# Patient Record
Sex: FEMALE | Race: OTHER | Employment: OTHER | URBAN - METROPOLITAN AREA
[De-identification: names, ages, dates, MRNs, and addresses within clinical notes are randomized per-mention and may not be internally consistent; named-entity substitution may affect disease eponyms.]

---

## 2017-01-04 ENCOUNTER — TELEPHONE (OUTPATIENT)
Dept: FAMILY MEDICINE CLINIC | Age: 82
End: 2017-01-04

## 2017-01-04 NOTE — TELEPHONE ENCOUNTER
Pt's son CURTIS saying the patient patient needs a referral to [facility]. He left their fax number #[phone]. Called the son to ask which physician and date of appt. No answer. Called [facility] to ask for this information - they could find no record of the patient having an appointment. LM asking the son to call [facility] - the pt does not have any [facility] providers as her PCP. In order for us to obtain the referral they will need to call [facility] to change her PCP.

## 2017-01-06 NOTE — TELEPHONE ENCOUNTER
Pt eb Zepeda called with the information about needing the backdated referral for [address] with Dr Ellen Emmanuel on 1/3/17.

## 2017-01-09 ENCOUNTER — HOSPITAL ENCOUNTER (OUTPATIENT)
Dept: WOUND CARE | Age: 82
Discharge: HOME | End: 2017-01-09
Payer: MEDICARE

## 2017-01-09 PROCEDURE — 99212 OFFICE O/P EST SF 10 MIN: CPT

## 2017-01-09 PROCEDURE — 97597 DBRDMT OPN WND 1ST 20 CM/<: CPT

## 2017-01-09 RX ORDER — CEPHALEXIN 500 MG/1
500 CAPSULE ORAL 4 TIMES DAILY
COMMUNITY
End: 2017-06-23 | Stop reason: ALTCHOICE

## 2017-01-09 RX ORDER — LIDOCAINE HYDROCHLORIDE 20 MG/ML
JELLY TOPICAL
Status: ACTIVE | OUTPATIENT
Start: 2017-01-09 | End: 2017-01-10

## 2017-01-09 NOTE — PROGRESS NOTES
[address] [place] [facility] [place] [address]   WOUND CARE PROGRESS NOTE       Name:  Ant Hu   MR#:  [id number]   :  1932   Account #:  [de-identified]        Date of Adm:  2017       DATE OF SERVICE: 2017     TREATING PHYSICIAN: Magdaleno Mckeon DPM    SUBJECTIVE: The patient presents today with her grandson for a   followup to a left venous leg ulceration. The patient denies any nausea,   vomiting, fevers, night sweats or chills. OBJECTIVE   VITAL SIGNS: Stable. The patient is afebrile. LEFT LOWER EXTREMITY: There is a 0.4 x 0.6 x 0.2 cm wound   along the lateral aspect of the left lateral malleolus. The wound has a   punched-out appearance and is granular in nature, with a small   amount of fibrosis noted on the 1-2 o'clock margin. DP and PT pulses   are palpable. Protective sensation is intact. There is dependent edema   in the left lower extremity, with numerous venous varicosities present. ASSESSMENT   1. Left venous leg ulceration. 2. Left lower extremity edema. PLAN   1. At today's visit, the wound was topically anesthetized with lidocaine,   and once anesthesia was achieved, it was excisionally debrided using   a sterile curette down to the level of subcutaneous tissue. 2. A piece of Aquacel and a 3-layer compression wrap were applied to   the left lower extremity. The patient is to keep this clean and dry. She   is to elevate when at rest.   3. The patient to follow up in 1 week for a recheck. Once we are able   to get this wound healed, will try to transition the patient to CircAid   wraps permanently.         PATT [facility] DIVINE EVANS   D:  2017   15:00   T:  2017   16:24   Job #:  [id number]

## 2017-01-10 NOTE — TELEPHONE ENCOUNTER
Called the patient's grandson, had to LM on his VM. The patient has Dr Summer Villalobos as her PCP which means we cannot get a backdated referral from [facility]. They need to call Naima to change her PCP to one of our providers, however we will not be able to backdate referrals.

## 2017-01-16 ENCOUNTER — HOSPITAL ENCOUNTER (OUTPATIENT)
Dept: WOUND CARE | Age: 82
Discharge: HOME | End: 2017-01-16
Payer: MEDICARE

## 2017-01-16 PROCEDURE — 97597 DBRDMT OPN WND 1ST 20 CM/<: CPT

## 2017-01-16 RX ORDER — LIDOCAINE HYDROCHLORIDE 20 MG/ML
JELLY TOPICAL
Status: COMPLETED | OUTPATIENT
Start: 2017-01-16 | End: 2017-01-16

## 2017-01-16 RX ADMIN — LIDOCAINE HYDROCHLORIDE 5 ML: 20 JELLY TOPICAL at 14:55

## 2017-01-19 NOTE — TELEPHONE ENCOUNTER
Pt's grandson called back, they had changed provider to Dr Иван Jaime in November. Referral was obtained.

## 2017-01-23 ENCOUNTER — HOSPITAL ENCOUNTER (OUTPATIENT)
Dept: WOUND CARE | Age: 82
Discharge: HOME | End: 2017-01-23
Payer: MEDICARE

## 2017-01-23 PROCEDURE — 97597 DBRDMT OPN WND 1ST 20 CM/<: CPT

## 2017-01-23 RX ORDER — LIDOCAINE HYDROCHLORIDE 20 MG/ML
JELLY TOPICAL
Status: COMPLETED | OUTPATIENT
Start: 2017-01-23 | End: 2017-01-23

## 2017-01-23 RX ADMIN — LIDOCAINE HYDROCHLORIDE: 20 JELLY TOPICAL at 14:19

## 2017-01-24 NOTE — PROGRESS NOTES
[address] [place] [facility] [place] [address]   WOUND CARE PROGRESS NOTE       Name:  Toya Owens   MR#:  [id number]   :  1932   Account #:  [de-identified]        Date of Adm:  2017       DATE OF SERVICE: 2017      TREATING PHYSICIAN: Deborah Pond DPM    SUBJECTIVE: The patient presents today with her grandson for   followup to a left venous leg ulceration. Denies any nausea, vomiting,   fevers, night sweats, chills. States she has pain in that left foot and leg   during the day. OBJECTIVE: Vital signs are stable. The patient afebrile. DP, PT pulses   are palpable. LEFT LOWER EXTREMITY: There is a 1.0 x 0.8 x 0.2 cm wound   along the lateral aspect of the left lower leg. The wound has a   punched-out appearance and is granular in nature with some small   diffuse areas of fibrosis. There is no purulent drainage, no malodor, no   erythema. There is dependent edema in the left lower extremity with   numerous venous varicosities. ASSESSMENT   1. Left venous leg ulceration. 2. Left lower extremity edema. PLAN   1. At today's visit, the wound was anesthetized with topical lidocaine   and once it was anesthetized, it was excisionally debrided using a   sterile curette down to a bleeding granular base. 2. Rocío and a 3-layer compression wrap were applied to the left   lower extremity. The patient is to keep this clean and dry and to   elevate when at rest.   3. A prescription for tramadol 50 mg, a total of 40 tablets was written   for the patient with the instructions to take 1 tab by mouth every 6-8   hours as needed for pain with no refills. 4. The patient is to followup with me in 1 w for a recheck. PATT Roldan   D:  2017   14:54   T:  2017   21:43   Job #:  [id number]     FAX COPY OF REPORT TO DR. Mariela Felix [phone].

## 2017-01-30 ENCOUNTER — HOSPITAL ENCOUNTER (OUTPATIENT)
Dept: WOUND CARE | Age: 82
Discharge: HOME | End: 2017-01-30
Payer: MEDICARE

## 2017-01-30 PROCEDURE — 97597 DBRDMT OPN WND 1ST 20 CM/<: CPT

## 2017-01-30 PROCEDURE — 74011000250 HC RX REV CODE- 250: Performed by: PODIATRIST

## 2017-01-30 RX ORDER — LIDOCAINE HYDROCHLORIDE 20 MG/ML
JELLY TOPICAL
Status: COMPLETED | OUTPATIENT
Start: 2017-01-30 | End: 2017-01-30

## 2017-01-30 RX ADMIN — LIDOCAINE HYDROCHLORIDE: 20 JELLY TOPICAL at 14:23

## 2017-01-31 NOTE — PROGRESS NOTES
[address] [place] [facility] [place] [facility], [address]   WOUND CARE PROGRESS NOTE       Name:  Brandon Galarza   MR#:  [id number]   :  1932   Account #:  [de-identified]        Date of Adm:  2017       DATE OF SERVICE: 2017    TREATING PHYSICIAN: Rosalia Blanco DPM    SUBJECTIVE: The patient presents today with her grandson for   followup to a left venous leg ulceration. Denies any nausea, vomiting,   fevers, night sweats or chills. Arrived with her compression wrap clean,   dry and intact. States that her pain is under control. PHYSICAL EXAMINATION   VITAL SIGNS: Baseline vital signs are stable. The patient is afebrile. LEFT LOWER EXTREMITY: There is a 0.9 x 0.8 x 0.2 cm wound   along the lateral aspect of the left lower leg. The wound has a   punched-out appearance and is granular in nature. There is no   purulent drainage, no malodor. There is dependent edema in the left   lower extremity. DP, PT pulses are palpable. ASSESSMENT   1. Left venous leg ulceration. 2. Left lower extremity edema. PLAN: I had a long discussion with the patient and her grandson this   afternoon. Clinically, her wound is looking clean and granular;   however, it has remained roughly the same size for the past 2 weeks. This is her third visit with me at the wound care center. I informed them   that we should probably look into more aggressive measures in order   to get her wound healed in a quicker fashion. We will submit paperwork to see if we can get Grafix preapproved for   the patient. In the meantime, we will stop using Rocío and we will   switch to Aquacel AG and a 3-layer compression wrap on the left lower   extremity. The patient is to keep this wrap clean and dry and she is to   elevate when at rest.    The patient is to follow with me in 1 week for a recheck. We will   hopefully have an answer regarding Grafix at that time.         PATT Castaneda / Joseph Memory D:  01/30/2017   15:35   T:  01/30/2017   20:37   Job #:  [id number]

## 2017-02-06 ENCOUNTER — HOSPITAL ENCOUNTER (OUTPATIENT)
Dept: WOUND CARE | Age: 82
Discharge: HOME | End: 2017-02-06
Payer: MEDICARE

## 2017-02-06 PROCEDURE — 97597 DBRDMT OPN WND 1ST 20 CM/<: CPT

## 2017-02-06 RX ORDER — LIDOCAINE HYDROCHLORIDE 20 MG/ML
JELLY TOPICAL
Status: COMPLETED | OUTPATIENT
Start: 2017-02-06 | End: 2017-02-06

## 2017-02-06 RX ADMIN — LIDOCAINE HYDROCHLORIDE: 20 JELLY TOPICAL at 14:59

## 2017-02-06 NOTE — PROGRESS NOTES
[address] [place] [facility] [place] [address]   WOUND CARE PROGRESS NOTE       Name:  Tyler Norwood   MR#:  [id number]   :  1932   Account #:  [de-identified]        Date of Adm:  2017       DATE OF SERVICE: 2017    TREATING PHYSICIAN: Sergo Yates DPM    SUBJECTIVE: The patient presents today with her grandson for   followup to a left venous leg ulceration. Denies any nausea, vomiting,   fevers, night sweats or chills. States that she has some pain in her   right lower extremity, where she had a wound. PHYSICAL EXAMINATION   VITAL SIGNS: Stable. The patient afebrile. LEFT LOWER EXTREMITY: There is a 0.9 x 0.8 x 0.1 cm wound   along the lateral aspect of the left lower leg. The wound is granular with   no acute signs of infection. There is dependent edema in the left lower   extremity with numerous venous varicosities. DP and PT pulses are   palpable. RIGHT LOWER EXTREMITY: There is a healed anterior venous leg   wound, with dependent edema and venous varicosities in the right   lower extremity. ASSESSMENT   1. Left venous leg ulceration. 2. Bilateral lower extremity edema. PLAN   1. I had a long discussion with the patient and her grandson this   afternoon. Presently, we have been doing Aquacel AG and a 3-layer   compression wrap. The patient's insurance has approved Grafix,   however, the patient has a very high copay with each application,   which she cannot afford. As a result, we need to figure out a different   way to facilitate wound healing. 2. We will start Rocío every other day and downgrade from the 3-  layer compression wrap to a double-layer Tubigrip compression wrap. The grandson will be educated on how to do wound care, and he will   do the wound care as instructed.    3. At today's visit, the wound was anesthetized with topical lidocaine,   and once anesthesia was achieved, it was excisionally debrided down   to a bleeding granular base, down to the level of subcutaneous tissue   using a sterile curette. Rocío and a double-layer Tubigrip   compression dressing was applied to left lower extremity. 4. Regarding the right lower extremity, the patient already has a   CircAid wrap that was ordered for the left side. We will temporarily   have her use on the right side to facilitate compression. She will benefit   from a matching CircAid wrap on the right lower extremity, so we will   measure her and order one for that side as well. 5. The patient to follow up in 1 week for a recheck.         Legrand Leventhal, DPM Montine Parry / Shane Giordano   D:  02/06/2017   15:30   T:  02/06/2017   16:16   Job #:  [id number]

## 2017-02-13 ENCOUNTER — HOSPITAL ENCOUNTER (OUTPATIENT)
Dept: WOUND CARE | Age: 82
Discharge: HOME | End: 2017-02-13
Payer: MEDICARE

## 2017-02-13 PROCEDURE — 97597 DBRDMT OPN WND 1ST 20 CM/<: CPT

## 2017-02-13 RX ORDER — LIDOCAINE HYDROCHLORIDE 20 MG/ML
JELLY TOPICAL
Status: COMPLETED | OUTPATIENT
Start: 2017-02-13 | End: 2017-02-13

## 2017-02-13 RX ADMIN — LIDOCAINE HYDROCHLORIDE: 20 JELLY TOPICAL at 14:29

## 2017-02-20 ENCOUNTER — HOSPITAL ENCOUNTER (OUTPATIENT)
Dept: WOUND CARE | Age: 82
Discharge: HOME | End: 2017-02-20
Payer: MEDICARE

## 2017-02-20 PROCEDURE — 97597 DBRDMT OPN WND 1ST 20 CM/<: CPT

## 2017-03-06 ENCOUNTER — HOSPITAL ENCOUNTER (OUTPATIENT)
Dept: WOUND CARE | Age: 82
Discharge: HOME | End: 2017-03-06
Payer: MEDICARE

## 2017-03-06 PROCEDURE — 99211 OFF/OP EST MAY X REQ PHY/QHP: CPT

## 2017-03-06 RX ORDER — LIDOCAINE HYDROCHLORIDE 20 MG/ML
JELLY TOPICAL
Status: COMPLETED | OUTPATIENT
Start: 2017-03-06 | End: 2017-03-06

## 2017-03-06 RX ADMIN — LIDOCAINE HYDROCHLORIDE: 20 JELLY TOPICAL at 14:41

## 2017-03-06 NOTE — PROGRESS NOTES
[address] [place] [facility] [place] [address]   WOUND CARE PROGRESS NOTE       Name:  Sybil Mcnair   MR#:  [id number]   :  1932   Account #:  [de-identified]        Date of Adm:  2017       DATE OF SERVICE: 2017. TREATING PHYSICIAN: Luis Enrique Armas DPM.    SUBJECTIVE: The patient presents today with her grandson for   followup to a left venous leg ulceration. Denies any nausea, vomiting,   fevers, night sweats or chills. PHYSICAL EXAMINATION   VITAL SIGNS: Stable. The patient is afebrile. LEFT LOWER EXTREMITY: The previously noted left lateral leg   wound is healed. There is no sign of infection. Neurovascular status is   intact and unchanged. Dependent edema is greatly improved and left   lower extremity. ASSESSMENT:   1. Left venous leg ulceration. 2. Left lower extremity edema. PLAN:   1. At today's visit, I had a long discussion with the patient and her   grandson. Her venous wound is now healed. She no longer needs   daily wound care. 2. The patient is to wear her CircAid wrap on her left lower extremity at   all times except for at night. She is to elevate when at rest.   3. The patient is discharged from the wound care center. She is to call   as needed.         PATT Blandon / Sumit.Tammy   D:  2017   15:14   T:  2017   15:52   Job #:  [id number]

## 2017-04-28 DIAGNOSIS — I87.2 VENOUS STASIS DERMATITIS OF RIGHT LOWER EXTREMITY: ICD-10-CM

## 2017-05-12 ENCOUNTER — DOCUMENTATION ONLY (OUTPATIENT)
Dept: FAMILY MEDICINE CLINIC | Age: 82
End: 2017-05-12

## 2017-05-12 RX ORDER — ASPIRIN 81 MG/1
TABLET ORAL
Qty: 180 TAB | Refills: 0 | Status: SHIPPED | OUTPATIENT
Start: 2017-05-12 | End: 2017-08-20 | Stop reason: SDUPTHER

## 2017-05-12 NOTE — PROGRESS NOTES
Pt [facility] for refill of asa 81mg daily #180 no refills, f/s utd, lov Dec 2016, per medication protocol authorizing refill.

## 2017-06-23 ENCOUNTER — OFFICE VISIT (OUTPATIENT)
Dept: FAMILY MEDICINE CLINIC | Age: 82
End: 2017-06-23

## 2017-06-23 VITALS
BODY MASS INDEX: 21.44 KG/M2 | HEIGHT: 60 IN | HEART RATE: 73 BPM | DIASTOLIC BLOOD PRESSURE: 57 MMHG | SYSTOLIC BLOOD PRESSURE: 120 MMHG | TEMPERATURE: 98.5 F | WEIGHT: 109.2 LBS

## 2017-06-23 DIAGNOSIS — R05.3 CHRONIC COUGH: Primary | ICD-10-CM

## 2017-06-23 DIAGNOSIS — K59.01 SLOW TRANSIT CONSTIPATION: ICD-10-CM

## 2017-06-23 RX ORDER — LACTULOSE 10 G/15ML
30 SOLUTION ORAL; RECTAL
Qty: 1000 ML | Refills: 1 | Status: SHIPPED | OUTPATIENT
Start: 2017-06-23 | End: 2018-01-22

## 2017-06-23 RX ORDER — ALBUTEROL SULFATE 90 UG/1
1 AEROSOL, METERED RESPIRATORY (INHALATION)
Qty: 1 INHALER | Refills: 0 | Status: SHIPPED | OUTPATIENT
Start: 2017-06-23 | End: 2017-11-06 | Stop reason: SDUPTHER

## 2017-06-23 NOTE — PROGRESS NOTES
Subjective:     Chief Complaint   Patient presents with   Javy Rash        She  is a 80 y.o. female who presents for evaluation of:  Chronic cough - sore throat, coughing. Sx x years. No f/c. Tried Robitussen and this did not help much. Had CXR in 2011 and concern with PNA at that point. Not worked up again since then. Strong Fhx of asthma, allergies, and eczema. Never had PFTs. Abd pain - bloating and constipation. Chronic constipation sx and has been using fiber supplements. Not walking and not drinking much water. No n/v.    ROS  Gen - no fever/chills  Resp - per HPI  CV - no chest pain or TINOCO  Rest per HPI    Past Medical History:   Diagnosis Date    Arthritis     HTN (hypertension)      Past Surgical History:   Procedure Laterality Date    HX TONSILLECTOMY       Current Outpatient Prescriptions on File Prior to Visit   Medication Sig Dispense Refill    aspirin delayed-release 81 mg tablet TAKE 1 TABLET BY MOUTH DAILY 180 Tab 0    Calcium Carbonate-Vit D3-Min (CALCIUM 600 + MINERALS) 600 mg calcium- 200 unit tab Please take 1 tab by mouth twice a day with meals 60 Tab 11     No current facility-administered medications on file prior to visit. Objective:     Vitals:    06/23/17 1425   BP: 120/57   Pulse: 73   Temp: 98.5 °F (36.9 °C)   TempSrc: Oral   Weight: 109 lb 3.2 oz (49.5 kg)   Height: 5' (1.524 m)     Physical Examination:  General appearance - alert, well appearing, and in no distress  Eyes -sclera anicteric  Neck - supple, no significant adenopathy, no thyromegaly, no bruits  Chest - clear to auscultation, slight wheezes, rales or rhonchi, symmetric air entry  Heart - normal rate, regular rhythm, normal S1, S2, no murmurs, rubs, clicks or gallops  Neurological - alert, oriented, no focal findings or movement disorder noted  Extr - no edema    Assessment/ Plan:   Tc Hardy was seen today for bloated.     Diagnoses and all orders for this visit:    Chronic cough - most c/w asthma, will get CXR and start albuterol to see if she responds well to this. -     XR CHEST PA LAT; Future  -     albuterol (PROVENTIL HFA, VENTOLIN HFA, PROAIR HFA) 90 mcg/actuation inhaler; Take 1 Puff by inhalation every four (4) hours as needed for Wheezing. Slow transit constipation - KUB and Lactulose. Encouraged water, walking, and fiber as well. -     XR ABD ACUTE W 1 V CHEST; Future  -     lactulose (CHRONULAC) 10 gram/15 mL solution; Take 45 mL by mouth two (2) times daily as needed. Indications: constipation     I have discussed the diagnosis with the patient and the intended plan as seen in the above orders. The patient has received an after-visit summary and questions were answered concerning future plans. I have discussed medication side effects and warnings with the patient as well. The patient verbalizes understanding and agreement with the plan. Follow-up Disposition:  Return in about 8 weeks (around 8/18/2017).

## 2017-06-23 NOTE — PROGRESS NOTES
Coordination of Care  1. Have you been to the ER, urgent care clinic since your last visit? Hospitalized since your last visit? No    2. Have you seen or consulted any other health care providers outside of the [facility] since your last visit? Include any pap smears or colon screening. No    Medications  Medication Reconciliation Performed: yes  Patient does not need refills     Learning Assessment Complete?  yes

## 2017-06-23 NOTE — MR AVS SNAPSHOT
Visit Information Date & Time Provider Department Dept. Phone Encounter #  
 6/23/2017  2:15 PM Diana Fairchild, [facility] [phone] [id number] Follow-up Instructions Return in about 8 weeks (around 8/18/2017). Your Appointments 6/30/2017  1:55 PM  
ROUTINE CARE with Diana Fairchild MD  
[facility] [address] [place] [address]) Appt Note: abdominal  pain [address] [facility] [address]  
  
   
 [address] [facility] Upcoming Health Maintenance Date Due ZOSTER VACCINE AGE 60> 11/22/1992 OSTEOPOROSIS SCREENING (DEXA) 11/22/1997 Pneumococcal 65+ Low/Medium Risk (2 of 2 - PCV13) 1/13/2017 MEDICARE YEARLY EXAM 3/10/2017 GLAUCOMA SCREENING Q2Y 7/8/2017 INFLUENZA AGE 9 TO ADULT 8/1/2017 DTaP/Tdap/Td series (2 - Td) 10/2/2025 Allergies as of 6/23/2017  Review Complete On: 6/23/2017 By: Diana Fairchild MD  
 No Known Allergies Current Immunizations  Reviewed on 1/13/2016 Name Date Influenza Vaccine 10/9/2015 Pneumococcal Polysaccharide (PPSV-23) 1/13/2016 Tdap 10/2/2015 Not reviewed this visit You Were Diagnosed With   
  
 Codes Comments Chronic cough    -  Primary ICD-10-CM: O01 ICD-9-CM: 786.2 Slow transit constipation     ICD-10-CM: K59.01 
ICD-9-CM: 564.01 Vitals BP Pulse Temp Height(growth percentile) Weight(growth percentile) BMI  
 120/57 73 98.5 °F (36.9 °C) (Oral) 5' (1.524 m) 109 lb 3.2 oz (49.5 kg) 21.33 kg/m2 OB Status Smoking Status Postmenopausal Former Smoker Vitals History BMI and BSA Data Body Mass Index Body Surface Area  
 21.33 kg/m 2 1.45 m 2 [facility] Name Phone Edmund Mari [phone] Your Updated Medication List  
  
   
 This list is accurate as of: 6/23/17  3:12 PM.  Always use your most recent med list.  
  
  
  
  
 albuterol 90 mcg/actuation inhaler Commonly known as:  PROVENTIL HFA, VENTOLIN HFA, PROAIR HFA Take 1 Puff by inhalation every four (4) hours as needed for Wheezing. aspirin delayed-release 81 mg tablet TAKE 1 TABLET BY MOUTH DAILY Calcium Carbonate-Vit D3-Min 600 mg calcium- 200 unit Tab Commonly known as:  CALCIUM 600 + MINERALS Please take 1 tab by mouth twice a day with meals  
  
 lactulose 10 gram/15 mL solution Commonly known as:  Hicks Hiss Take 45 mL by mouth two (2) times daily as needed. Indications: constipation Recetas Enviado a la [place] Refills  
 lactulose (CHRONULAC) 10 gram/15 mL solution 1 Sig: Take 45 mL by mouth two (2) times daily as needed. Indications: constipation Class: Normal  
 Pharmacy: [facility] Ph #: [phone] Route: Oral  
 albuterol (PROVENTIL HFA, VENTOLIN HFA, PROAIR HFA) 90 mcg/actuation inhaler 0 Sig: Take 1 Puff by inhalation every four (4) hours as needed for Wheezing. Class: Normal  
 Pharmacy: [facility], [facility] [place] Ph #: [phone] Route: Inhalation Follow-up Instructions Return in about 8 weeks (around 8/18/2017). To-Do List   
 06/23/2017 Imaging:  XR ABD ACUTE W 1 V CHEST   
  
 06/23/2017 Imaging:  XR CHEST PA LAT Introducing [facility]! Estimado Abril : 
Gabino por solicitar angeles cuenta de MyChart usted. Nuestros registros indican que usted ya tiene angeles cuenta MyChart [place]. Usted puede acceder a canseco cuenta en cualquier momento en [email or web address] Sabía usted que usted puede acceder a canseco hospital y las instrucciones de claudia ER en cualquier momento en MyChart ? También puede revisar [facility] de las pruebas de canseco hospitalización o visita a urgencias . Información Adicional 
 
Si tiene alguna pregunta , por favor visite la sección de preguntas frecuentes del sitio web MyChart en [email or web address] . Recuerde, MyChart NO es que se utilizará para las necesidades urgentes. Para emergencias médicas , llame al 911 . Ahora disponible en canseco iPhone y Android ! Por favor proporcione jason resumen de la documentación de cuidado a canseco próximo proveedor. Your primary care clinician is listed as Garr Libman. If you have any questions after today's visit, please call [phone].

## 2017-06-26 ENCOUNTER — HOSPITAL ENCOUNTER (OUTPATIENT)
Dept: GENERAL RADIOLOGY | Age: 82
Discharge: HOME | End: 2017-06-26
Payer: MEDICARE

## 2017-06-26 DIAGNOSIS — K59.01 SLOW TRANSIT CONSTIPATION: ICD-10-CM

## 2017-06-26 DIAGNOSIS — R05.3 CHRONIC COUGH: ICD-10-CM

## 2017-06-26 PROCEDURE — 74020 XR ABD FLAT/ ERECT: CPT

## 2017-06-26 PROCEDURE — 71020 XR CHEST PA LAT: CPT

## 2017-06-29 NOTE — PROGRESS NOTES
[employer] message sent for labs - RLL infiltrate with pleural reaction, sx x years. Will need to work this up further with a chest CT if desired. Your chest x-ray showed an area in the right lower lobe of the lung that we can investigate further if desired. Since you have had symptoms for years, this is probably not a pneumonia or anything aggressive. We can discuss next steps but at Chest CT would likely be the next test we would consider. Call with any questions.     Thanks,  Mercedes Shepard MD

## 2017-08-20 DIAGNOSIS — I87.2 VENOUS STASIS DERMATITIS OF RIGHT LOWER EXTREMITY: ICD-10-CM

## 2017-08-20 RX ORDER — ASPIRIN 81 MG/1
TABLET ORAL
Qty: 180 TAB | Refills: 0 | Status: SHIPPED | OUTPATIENT
Start: 2017-08-20 | End: 2017-12-01 | Stop reason: SDUPTHER

## 2017-10-19 ENCOUNTER — HOSPITAL ENCOUNTER (OUTPATIENT)
Dept: LAB | Age: 82
Discharge: HOME | End: 2017-10-19

## 2017-10-19 ENCOUNTER — OFFICE VISIT (OUTPATIENT)
Dept: FAMILY MEDICINE CLINIC | Age: 82
End: 2017-10-19

## 2017-10-19 VITALS
TEMPERATURE: 98.5 F | SYSTOLIC BLOOD PRESSURE: 130 MMHG | DIASTOLIC BLOOD PRESSURE: 55 MMHG | BODY MASS INDEX: 21.48 KG/M2 | OXYGEN SATURATION: 96 % | WEIGHT: 110 LBS | HEART RATE: 76 BPM

## 2017-10-19 DIAGNOSIS — F41.9 ANXIETY: ICD-10-CM

## 2017-10-19 DIAGNOSIS — R13.10 DIFFICULTY SWALLOWING SOLIDS: ICD-10-CM

## 2017-10-19 DIAGNOSIS — R05.9 COUGH: Primary | ICD-10-CM

## 2017-10-19 DIAGNOSIS — R05.9 COUGH: ICD-10-CM

## 2017-10-19 DIAGNOSIS — J39.2 DRY THROAT: ICD-10-CM

## 2017-10-19 LAB
ALBUMIN SERPL-MCNC: 3.5 G/DL (ref 3.5–5)
ALBUMIN/GLOB SERPL: 1.1 {RATIO} (ref 1.1–2.2)
ALP SERPL-CCNC: 115 U/L (ref 45–117)
ALT SERPL-CCNC: 23 U/L (ref 12–78)
ANION GAP SERPL CALC-SCNC: 7 MMOL/L (ref 5–15)
AST SERPL-CCNC: 19 U/L (ref 15–37)
BASOPHILS # BLD: 0 K/UL (ref 0–0.1)
BASOPHILS NFR BLD: 0 % (ref 0–1)
BILIRUB SERPL-MCNC: 0.3 MG/DL (ref 0.2–1)
BUN SERPL-MCNC: 13 MG/DL (ref 6–20)
BUN/CREAT SERPL: 18 (ref 12–20)
CALCIUM SERPL-MCNC: 8.1 MG/DL (ref 8.5–10.1)
CHLORIDE SERPL-SCNC: 100 MMOL/L (ref 97–108)
CO2 SERPL-SCNC: 29 MMOL/L (ref 21–32)
CREAT SERPL-MCNC: 0.71 MG/DL (ref 0.55–1.02)
EOSINOPHIL # BLD: 0.1 K/UL (ref 0–0.4)
EOSINOPHIL NFR BLD: 1 % (ref 0–7)
ERYTHROCYTE [DISTWIDTH] IN BLOOD BY AUTOMATED COUNT: 13.4 % (ref 11.5–14.5)
GLOBULIN SER CALC-MCNC: 3.2 G/DL (ref 2–4)
GLUCOSE SERPL-MCNC: 92 MG/DL (ref 65–100)
HCT VFR BLD AUTO: 35.5 % (ref 35–47)
HGB BLD-MCNC: 12 G/DL (ref 11.5–16)
LYMPHOCYTES # BLD: 1.3 K/UL (ref 0.8–3.5)
LYMPHOCYTES NFR BLD: 27 % (ref 12–49)
MCH RBC QN AUTO: 30.7 PG (ref 26–34)
MCHC RBC AUTO-ENTMCNC: 33.8 G/DL (ref 30–36.5)
MCV RBC AUTO: 90.8 FL (ref 80–99)
MONOCYTES # BLD: 0.4 K/UL (ref 0–1)
MONOCYTES NFR BLD: 7 % (ref 5–13)
NEUTS SEG # BLD: 3.2 K/UL (ref 1.8–8)
NEUTS SEG NFR BLD: 65 % (ref 32–75)
PLATELET # BLD AUTO: 297 K/UL (ref 150–400)
POTASSIUM SERPL-SCNC: 4.2 MMOL/L (ref 3.5–5.1)
PROT SERPL-MCNC: 6.7 G/DL (ref 6.4–8.2)
RBC # BLD AUTO: 3.91 M/UL (ref 3.8–5.2)
SODIUM SERPL-SCNC: 136 MMOL/L (ref 136–145)
TSH SERPL DL<=0.05 MIU/L-ACNC: 4.63 UIU/ML (ref 0.36–3.74)
WBC # BLD AUTO: 4.9 K/UL (ref 3.6–11)

## 2017-10-19 PROCEDURE — 84443 ASSAY THYROID STIM HORMONE: CPT | Performed by: NURSE PRACTITIONER

## 2017-10-19 PROCEDURE — 85025 COMPLETE CBC W/AUTO DIFF WBC: CPT | Performed by: NURSE PRACTITIONER

## 2017-10-19 PROCEDURE — 80053 COMPREHEN METABOLIC PANEL: CPT | Performed by: NURSE PRACTITIONER

## 2017-10-19 RX ORDER — ALBUTEROL SULFATE 0.83 MG/ML
2.5 SOLUTION RESPIRATORY (INHALATION) ONCE
Qty: 1 EACH | Refills: 0
Start: 2017-10-19 | End: 2017-10-19

## 2017-10-19 RX ORDER — DIAZEPAM 2 MG/1
TABLET ORAL
Qty: 3 TAB | Refills: 0 | Status: SHIPPED | OUTPATIENT
Start: 2017-10-19 | End: 2018-01-22

## 2017-10-19 NOTE — PROGRESS NOTES
Assessment/Plan:       ICD-10-CM ICD-9-CM    1. Cough R05 786.2 albuterol (PROVENTIL VENTOLIN) 2.5 mg /3 mL (0.083 %) nebulizer solution      ALBUTEROL, INHAL. SOL., FDA-APPROVED FINAL, NON-COMPOUND UNIT DOSE, 1 MG      INHAL RX, AIRWAY OBST/DX SPUTUM INDUCT      CT CHEST W WO CONT      METABOLIC PANEL, COMPREHENSIVE      CBC WITH AUTOMATED DIFF   2. Dry throat J39.2 478.29    3. Difficulty swallowing solids R13.10 787.20 TSH 3RD GENERATION   4. Anxiety F41.9 300.00 diazePAM (VALIUM) 2 mg tablet       [facility] [address]  [address] [address]  Phone: [phone] Fax: [phone]      [facility]  Subjective:     Chief Complaint   Patient presents with    Cough     follow up     Laine Weaver is a 80 y.o. OTHER female. HPI: chest CT is needed. Here with family member who is interpreting; she does speak [place]. Cough is all the time. A lot of phlegm. Robitussin does help but her family prefers she doesn't use it every day. She was afraid to use the inhaler prescribed last time because \"I don't have asthma. \" She  has a past medical history of Arthritis and HTN (hypertension). She has Elevated BP, Cough, Allergic rhinitis due to allergen, Arthritis, Need for pneumococcal vaccine, and Medicare annual wellness visit, subsequent on her problem list.    Speaks English. Here with family member who explains that the last time she needed something for anxiety was about 20 years ago when a family member had passed away. She used to take Valium and used it sparingly. Review of Systems: Negative for: fever, chest pain, shortness of breath, leg swelling, exertional dyspnea, palpitations.   Current Medications:   Current Outpatient Prescriptions on File Prior to Visit   Medication Sig    aspirin delayed-release 81 mg tablet TAKE 1 TABLET BY MOUTH DAILY    lactulose (CHRONULAC) 10 gram/15 mL solution Take 45 mL by mouth two (2) times daily as needed. Indications: constipation    albuterol (PROVENTIL HFA, VENTOLIN HFA, PROAIR HFA) 90 mcg/actuation inhaler Take 1 Puff by inhalation every four (4) hours as needed for Wheezing.  Calcium Carbonate-Vit D3-Min (CALCIUM 600 + MINERALS) 600 mg calcium- 200 unit tab Please take 1 tab by mouth twice a day with meals     No current facility-administered medications on file prior to visit. Past Surgical History: She  has a past surgical history that includes tonsillectomy. Social and Family History: She  reports that she quit smoking about 26 years ago. She has never used smokeless tobacco. She reports that she does not drink alcohol or use illicit drugs. ; family history is not on file. Objective:     Vitals:    10/19/17 1015   BP: 130/55   Pulse: 76   Temp: 98.5 °F (36.9 °C)   TempSrc: Oral   SpO2: 96%   Weight: 110 lb (49.9 kg)    No LMP recorded. Patient is postmenopausal.   Wt Readings from Last 2 Encounters:   10/19/17 110 lb (49.9 kg)   06/23/17 109 lb 3.2 oz (49.5 kg)     No results found for any visits on 10/19/17. Physical Examination:  General appearance - well developed, no acute distress. Chest - inspiratory wheezing right upper lobe; cleared following nebulizer treatment. Heart - regular rate and rhythm without murmurs, rubs, or gallops. Abdomen - bowel sounds present x 4, NT, ND. Extremities - pulses intact. No peripheral edema. Assessment/Plan:   Diagnoses and all orders for this visit:    1. Cough  -     albuterol (PROVENTIL VENTOLIN) 2.5 mg /3 mL (0.083 %) nebulizer solution; 3 mL by Nebulization route once for 1 dose. -     ALBUTEROL, INHAL. [facility].()  -     INHAL RX, AIRWAY OBST/DX SPUTUM INDUCT ([id number])  -     CT CHEST W WO CONT; Future  -     METABOLIC PANEL, COMPREHENSIVE; Future  -     CBC WITH AUTOMATED DIFF; Future    2. Dry throat    3. Difficulty swallowing solids  -     TSH 3RD GENERATION; Future    4.  Anxiety  -     diazePAM (VALIUM) 2 mg tablet; Take 0.5 to 1 tab daily prn anxiety. Use sparingly. Follow-up Disposition: Not on File   I explained valium is not used on a long-term basis under any circumstances; I have checked  today    And there are no listings for this patient. I will provide her with a small prescription for acute anxiety and I have asked her to return to discuss long-term options should her anxiety continue. She subjectively and objectively improved with her cough following nebulizer treatment. I have advised that she use the inhaler prn since it contains the same medication as the neb treatment, that she does not have asthma but a cough related to reactive airways. Due to the long duration of this cough and the fact that it is getting worse, we will also have her obtain the chest CT as has been previously recommended based upon the last chest x-ray. Will contact patient if results of labs or imaging requires a change in treatment plan. Lisandro Giraldo, ADRIAN, FNP-BC, BC-ADM  Mason Jean expressed understanding of this plan.

## 2017-10-19 NOTE — PROGRESS NOTES
Coordination of Care  1. Have you been to the ER, urgent care clinic since your last visit? Hospitalized since your last visit? No    2. Have you seen or consulted any other health care providers outside of the [address] [place] [address] since your last visit? Include any pap smears or colon screening. No    Medications  Does the patient need refills? NO    Learning Assessment Complete?  yes

## 2017-10-19 NOTE — PROGRESS NOTES
Pt given nebulizer treatment with Albuterol per order of Kurt Quiroz and tolerated well. [place] Wood back in room to assess pt after breathing treatment.   Vickie Martínez RN

## 2017-10-19 NOTE — MR AVS SNAPSHOT
Visit Information Date & Time Provider Department Dept. Phone Encounter #  
 10/19/2017 10:10 AM Santino Hernandez [facility] [phone] [id number] Upcoming Health Maintenance Date Due ZOSTER VACCINE AGE 60> 9/22/1992 OSTEOPOROSIS SCREENING (DEXA) 11/22/1997 Pneumococcal 65+ Low/Medium Risk (2 of 2 - PCV13) 1/13/2017 MEDICARE YEARLY EXAM 3/10/2017 GLAUCOMA SCREENING Q2Y 7/8/2017 INFLUENZA AGE 9 TO ADULT 8/1/2017 DTaP/Tdap/Td series (2 - Td) 10/2/2025 Allergies as of 10/19/2017  Review Complete On: 10/19/2017 By: Tamanna Heath NP No Known Allergies Current Immunizations  Reviewed on 1/13/2016 Name Date Influenza Vaccine 10/9/2015 Pneumococcal Polysaccharide (PPSV-23) 1/13/2016 Tdap 10/2/2015 Not reviewed this visit You Were Diagnosed With   
  
 Codes Comments Cough    -  Primary ICD-10-CM: I39 ICD-9-CM: 173. 2 Vitals BP Pulse Temp Weight(growth percentile) SpO2 BMI  
 130/55 (BP 1 Location: Left arm, BP Patient Position: Sitting) 76 98.5 °F (36.9 °C) (Oral) 110 lb (49.9 kg) 96% 21.48 kg/m2 OB Status Smoking Status Postmenopausal Former Smoker Vitals History BMI and BSA Data Body Mass Index Body Surface Area  
 21.48 kg/m 2 1.45 m 2 [facility] Name Phone Edmund Gonsalez 78 [phone] Your Updated Medication List  
  
   
This list is accurate as of: 10/19/17 11:01 AM.  Always use your most recent med list.  
  
  
  
  
 * albuterol 90 mcg/actuation inhaler Commonly known as:  PROVENTIL HFA, VENTOLIN HFA, PROAIR HFA Take 1 Puff by inhalation every four (4) hours as needed for Wheezing. * albuterol 2.5 mg /3 mL (0.083 %) nebulizer solution Commonly known as:  PROVENTIL VENTOLIN  
3 mL by Nebulization route once for 1 dose. aspirin delayed-release 81 mg tablet TAKE 1 TABLET BY MOUTH DAILY Calcium Carbonate-Vit D3-Min 600 mg calcium- 200 unit Tab Commonly known as:  CALCIUM 600 + MINERALS Please take 1 tab by mouth twice a day with meals  
  
 lactulose 10 gram/15 mL solution Commonly known as:  Aloma Parent Take 45 mL by mouth two (2) times daily as needed. Indications: constipation * Notice: This list has 2 medication(s) that are the same as other medications prescribed for you. Read the directions carefully, and ask your doctor or other care provider to review them with you. We Performed the Following ALBUTEROL, INHAL. SOL., FDA-APPROVED FINAL, NON-COMPOUND UNIT DOSE, 1 MG [ HCPCS] INHAL RX, AIRWAY OBST/DX SPUTUM INDUCT [id number] CPT(R)] To-Do List   
 10/19/2017 Imaging:  CT CHEST W WO CONT Introducing [facility] & [facility] SERVICES! Estimado Abril : 
Gabino por solicitar angeles cuenta de MyChart usted. Nuestros registros indican que usted ya tiene angeles cuenta [facility]. Usted puede acceder a canseco cuenta en cualquier momento en [email or web address] Sabía usted que usted puede acceder a canseco hospital y las instrucciones de claudia ER en cualquier momento en MyChart ? También puede revisar [employer] de las pruebas de canseco hospitalización o visita a urgencias . Información Adicional 
 
Si tiene alguna pregunta , por favor visite la sección de preguntas frecuentes del sitio web MyChart en [email or web address] . Recuerde, MyChart NO es que se utilizará para las necesidades urgentes. Para emergencias médicas , llame al 911 . Ahora disponible en canseco iPhone y Android ! Por favor proporcione jason resumen de la documentación de cuidado a canseco próximo proveedor. Your primary care clinician is listed as Juvencio Jain. If you have any questions after today's visit, please call [phone].

## 2017-10-23 NOTE — PROGRESS NOTES
We will repeat your thyroid test next time. Labs normal.  Vamos a repetir canseco examen de raul para tiroides la proxima vez. Dot Peek son normales.

## 2017-11-06 ENCOUNTER — OFFICE VISIT (OUTPATIENT)
Dept: FAMILY MEDICINE CLINIC | Age: 82
End: 2017-11-06

## 2017-11-06 VITALS
HEART RATE: 71 BPM | TEMPERATURE: 98.6 F | WEIGHT: 111 LBS | DIASTOLIC BLOOD PRESSURE: 54 MMHG | SYSTOLIC BLOOD PRESSURE: 106 MMHG | BODY MASS INDEX: 21.68 KG/M2

## 2017-11-06 DIAGNOSIS — J98.09 RECURRENT BRONCHOSPASM: Primary | ICD-10-CM

## 2017-11-06 DIAGNOSIS — R05.9 COUGH: ICD-10-CM

## 2017-11-06 RX ORDER — ALBUTEROL SULFATE 90 UG/1
1 AEROSOL, METERED RESPIRATORY (INHALATION)
Qty: 1 INHALER | Refills: 3 | Status: SHIPPED | OUTPATIENT
Start: 2017-11-06 | End: 2018-01-22

## 2017-11-06 RX ORDER — ALBUTEROL SULFATE 90 UG/1
1 AEROSOL, METERED RESPIRATORY (INHALATION)
Qty: 1 INHALER | Refills: 3 | Status: SHIPPED | OUTPATIENT
Start: 2017-11-06 | End: 2017-11-06 | Stop reason: SDUPTHER

## 2017-11-06 NOTE — MR AVS SNAPSHOT
Visit Information Date & Time Provider Department Dept. Phone Encounter #  
 11/6/2017 10:10 AM Alex Bradley [facility] [phone] [id number] Upcoming Health Maintenance Date Due ZOSTER VACCINE AGE 60> 9/22/1992 OSTEOPOROSIS SCREENING (DEXA) 11/22/1997 Pneumococcal 65+ Low/Medium Risk (2 of 2 - PCV13) 1/13/2017 MEDICARE YEARLY EXAM 3/10/2017 GLAUCOMA SCREENING Q2Y 7/8/2017 INFLUENZA AGE 9 TO ADULT 8/1/2017 DTaP/Tdap/Td series (2 - Td) 10/2/2025 Allergies as of 11/6/2017  Review Complete On: 11/6/2017 By: Alex Bradley NP No Known Allergies Current Immunizations  Reviewed on 1/13/2016 Name Date Influenza Vaccine 10/9/2015 Pneumococcal Polysaccharide (PPSV-23) 1/13/2016 Tdap 10/2/2015 Not reviewed this visit You Were Diagnosed With   
  
 Codes Comments Cough    -  Primary ICD-10-CM: S53 ICD-9-CM: 366. 2 Vitals BP Pulse Temp Weight(growth percentile) BMI OB Status 106/54 (BP 1 Location: Left arm, BP Patient Position: Sitting) 71 98.6 °F (37 °C) (Oral) 111 lb (50.3 kg) 21.68 kg/m2 Postmenopausal  
 Smoking Status Former Smoker Vitals History BMI and BSA Data Body Mass Index Body Surface Area  
 21.68 kg/m 2 1.46 m 2 [facility] Pharm[facility] Name Phone Edmund Gonsalez [phone] Your Updated Medication List  
  
   
This list is accurate as of: 11/6/17 11:40 AM.  Always use your most recent med list.  
  
  
  
  
 albuterol 90 mcg/actuation inhaler Commonly known as:  PROVENTIL HFA, VENTOLIN HFA, PROAIR HFA Take 1 Puff by inhalation every four (4) hours as needed for Wheezing. aspirin delayed-release 81 mg tablet TAKE 1 TABLET BY MOUTH DAILY Calcium Carbonate-Vit D3-Min 600 mg calcium- 200 unit Tab Commonly known as:  CALCIUM 600 + MINERALS Please take 1 tab by mouth twice a day with meals  
  
 diazePAM 2 mg tablet Commonly known as:  VALIUM Take 0.5 to 1 tab daily prn anxiety. Use sparingly. lactulose 10 gram/15 mL solution Commonly known as:  Orval Crafts Take 45 mL by mouth two (2) times daily as needed. Indications: constipation Impresion de recetas Refills  
 albuterol (PROVENTIL HFA, VENTOLIN HFA, PROAIR HFA) 90 mcg/actuation inhaler 3 Sig: Take 1 Puff by inhalation every four (4) hours as needed for Wheezing. Class: Print Route: Inhalation Introducing [facility] SERVICES! Estimado Abril : 
Gabino por solicitar angeles cuenta de MyChart usted. Nuestros registros indican que usted ya tiene angeles cuenta MyChart [place]. Usted puede acceder a canseco cuenta en cualquier momento en [email or web address] Sabía usted que usted puede acceder a canseco hospital y las instrucciones de claudia ER en cualquier momento en MyChart ? También puede revisar [facility] de las pruebas de canseco hospitalización o visita a urgencias . Información Adicional 
 
Si tiene alguna pregunta , por favor visite la sección de preguntas frecuentes del sitio web MyChart en [email or web address] . Recuerde, MyChart NO es que se utilizará para las necesidades urgentes. Para emergencias médicas , llame al 911 . Ahora disponible en canseco iPhone y Android ! Por favor proporcione jason resumen de la documentación de cuidado a canseco próximo proveedor. Your primary care clinician is listed as Joseph Davila. If you have any questions after today's visit, please call [phone].

## 2017-11-06 NOTE — PROGRESS NOTES
Assessment/Plan:       ICD-10-CM ICD-9-CM    1. Recurrent bronchospasm J98.09 519.19 albuterol (PROVENTIL HFA, VENTOLIN HFA, PROAIR HFA) 90 mcg/actuation inhaler   2. Cough R05 786.2 DISCONTINUED: albuterol (PROVENTIL HFA, VENTOLIN HFA, PROAIR HFA) 90 mcg/actuation inhaler      DISCONTINUED: albuterol (PROVENTIL HFA, VENTOLIN HFA, PROAIR HFA) 90 mcg/actuation inhaler   Here with her ?grandson. She speaks [place]. [employer] [address] - [place], [address]  [facility] [address] [address]  Phone: [phone] Fax: [phone]      [facility]  Subjective:     Chief Complaint   Patient presents with    Cough     Cough recheck    Daylin Gupta is a 80 y.o. OTHER female. HPI: She  has a past medical history of Arthritis and HTN (hypertension). She has Elevated BP, Cough, Allergic rhinitis due to allergen, Arthritis, Need for pneumococcal vaccine, and Medicare annual wellness visit, subsequent on her problem list.   Review of Systems: Negative for: fever, chest pain, shortness of breath, leg swelling, exertional dyspnea, palpitations. Current Medications:   Current Outpatient Prescriptions on File Prior to Visit   Medication Sig    diazePAM (VALIUM) 2 mg tablet Take 0.5 to 1 tab daily prn anxiety. Use sparingly.  aspirin delayed-release 81 mg tablet TAKE 1 TABLET BY MOUTH DAILY    lactulose (CHRONULAC) 10 gram/15 mL solution Take 45 mL by mouth two (2) times daily as needed. Indications: constipation    Calcium Carbonate-Vit D3-Min (CALCIUM 600 + MINERALS) 600 mg calcium- 200 unit tab Please take 1 tab by mouth twice a day with meals     No current facility-administered medications on file prior to visit. Past Surgical History: She  has a past surgical history that includes tonsillectomy. Social and Family History: She  reports that she quit smoking about 26 years ago.  She has never used smokeless tobacco. She reports that she does not drink alcohol or use illicit drugs. ; family history is not on file. Objective:     Vitals:    11/06/17 1003   BP: 106/54   Pulse: 71   Temp: 98.6 °F (37 °C)   TempSrc: Oral   Weight: 111 lb (50.3 kg)    No LMP recorded. Patient is postmenopausal.   Wt Readings from Last 2 Encounters:   11/06/17 111 lb (50.3 kg)   10/19/17 110 lb (49.9 kg)     No results found for any visits on 11/06/17. Physical Examination:  General appearance - well developed, no acute distress. Chest - expiratory wheezing noted, cleared with cough. Heart - regular rate and rhythm without murmurs, rubs, or gallops. Abdomen - bowel sounds present x 4, NT, ND. Extremities - pulses intact. No peripheral edema. Assessment/Plan:   Diagnoses and all orders for this visit:    1. Recurrent bronchospasm  -     albuterol (PROVENTIL HFA, VENTOLIN HFA, PROAIR HFA) 90 mcg/actuation inhaler; Take 1 Puff by inhalation every four (4) hours as needed for Wheezing. 2. Cough    I provided phone number to call to schedule the CT exam regarding her lungs/cough. The problem was that the first albuterol inhaler I ordered required a preauthorization. By switching to a different brand, it now does not need a pre-authorization. Follow-up Disposition:  Return if symptoms worsen or fail to improve. Susan Bojorquez, DNP, FNP-BC, BC-ADM  Kasey Larsen expressed understanding of this plan.

## 2017-11-06 NOTE — PROGRESS NOTES
I called [facility] for PA for Proair HFA and found out they have Ventolin on their formulary which Haylee Sage okayed. This does not need PA and will be filled today.  Shae Novoa RN

## 2017-11-06 NOTE — PROGRESS NOTES
Coordination of Care  1. Have you been to the ER, urgent care clinic since your last visit? Hospitalized since your last visit? No    2. Have you seen or consulted any other health care providers outside of the [address] [place] [address] since your last visit? Include any pap smears or colon screening. No    Medications  Does the patient need refills? N/A    Learning Assessment Complete?  yes

## 2017-11-09 ENCOUNTER — OFFICE VISIT (OUTPATIENT)
Dept: FAMILY MEDICINE CLINIC | Age: 82
End: 2017-11-09

## 2017-11-09 VITALS
DIASTOLIC BLOOD PRESSURE: 63 MMHG | OXYGEN SATURATION: 100 % | WEIGHT: 109 LBS | TEMPERATURE: 98.9 F | HEART RATE: 73 BPM | SYSTOLIC BLOOD PRESSURE: 131 MMHG | BODY MASS INDEX: 21.29 KG/M2

## 2017-11-09 DIAGNOSIS — R05.9 COUGH: Primary | ICD-10-CM

## 2017-11-09 NOTE — PROGRESS NOTES
Coordination of Care  1. Have you been to the ER, urgent care clinic since your last visit? Hospitalized since your last visit? No    2. Have you seen or consulted any other health care providers outside of the [address] [place] [address] since your last visit? Include any pap smears or colon screening. No    Medications  Does the patient need refills? NO    Learning Assessment Complete?  yes

## 2017-11-09 NOTE — MR AVS SNAPSHOT
Visit Information Date & Time Provider Department Dept. Phone Encounter #  
 11/9/2017  3:15 PM Santino Hanks [phone] [id number] Follow-up Instructions Return for next week after the CT to review results. Upcoming Health Maintenance Date Due ZOSTER VACCINE AGE 60> 9/22/1992 OSTEOPOROSIS SCREENING (DEXA) 11/22/1997 Pneumococcal 65+ Low/Medium Risk (2 of 2 - PCV13) 1/13/2017 MEDICARE YEARLY EXAM 3/10/2017 GLAUCOMA SCREENING Q2Y 7/8/2017 Influenza Age 5 to Adult 8/1/2017 DTaP/Tdap/Td series (2 - Td) 10/2/2025 Allergies as of 11/9/2017  Review Complete On: 11/9/2017 By: Robb Elizabeth No Known Allergies Current Immunizations  Reviewed on 1/13/2016 Name Date Influenza Vaccine 10/9/2015 Pneumococcal Polysaccharide (PPSV-23) 1/13/2016 Tdap 10/2/2015 Not reviewed this visit You Were Diagnosed With   
  
 Codes Comments Cough    -  Primary ICD-10-CM: R57 ICD-9-CM: 110. 2 Vitals BP Pulse Temp Weight(growth percentile) SpO2 BMI  
 131/63 (BP 1 Location: Left arm) 73 98.9 °F (37.2 °C) (Oral) 109 lb (49.4 kg) 100% 21.29 kg/m2 OB Status Smoking Status Postmenopausal Former Smoker Vitals History BMI and BSA Data Body Mass Index Body Surface Area  
 21.29 kg/m 2 1.45 m 2 [facility] Name Phone Edmund Gonsalez 78 [phone] Your Updated Medication List  
  
   
This list is accurate as of: 11/9/17  4:01 PM.  Always use your most recent med list.  
  
  
  
  
 albuterol 90 mcg/actuation inhaler Commonly known as:  PROVENTIL HFA, VENTOLIN HFA, PROAIR HFA Take 1 Puff by inhalation every four (4) hours as needed for Wheezing. aspirin delayed-release 81 mg tablet TAKE 1 TABLET BY MOUTH DAILY Calcium Carbonate-Vit D3-Min 600 mg calcium- 200 unit Tab Commonly known as:  CALCIUM 600 + MINERALS Please take 1 tab by mouth twice a day with meals  
  
 diazePAM 2 mg tablet Commonly known as:  VALIUM Take 0.5 to 1 tab daily prn anxiety. Use sparingly. lactulose 10 gram/15 mL solution Commonly known as:  Sanjay Goo Take 45 mL by mouth two (2) times daily as needed. Indications: constipation Follow-up Instructions Return for next week after the CT to review results. Patient Instructions Please call the  to schedule the CT exam: 
[phone] Introducing [facility]! Estimado Abril : 
Gabino por solicitar angeles cuenta de MyChart usted. Nuestros registros indican que usted ya tiene angeles cuenta MyChart [place]. Usted puede acceder a canseco cuenta en cualquier momento en [email or web address] Sabía usted que usted puede acceder a canseco hospital y las instrucciones de claudia ER en cualquier momento en MyChart ? También puede revisar [facility] de las pruebas de canseco hospitalización o visita a urgencias . Información Adicional 
 
Si tiene alguna pregunta , por favor visite la sección de preguntas frecuentes del sitio web MyChart en [email or web address] . Recuerde, MyChart NO es que se utilizará para las necesidades urgentes. Para emergencias médicas , llame al 911 . Ahora disponible en canseco iPhone y Android ! Por favor proporcione jason resumen de la documentación de cuidado a canseco próximo proveedor. Your primary care clinician is listed as Bridgett Chiang. If you have any questions after today's visit, please call [phone].

## 2017-11-25 NOTE — PROGRESS NOTES
Assessment/Plan:       ICD-10-CM ICD-9-CM    1. Cough R05 [address] [facility] [place] [address]  Phone: [phone] Fax: [phone]      DANIELLAE Roe Sampson  Subjective:     Chief Complaint   Patient presents with    Cough     f/u, pt is having a hard time using the inhaler. Pt c/o throat burning and having to burp after using inhaler. Aryan Young is a 80 y.o. OTHER female. HPI:  She  has a past medical history of Arthritis and HTN (hypertension). She has Elevated BP, Cough, Allergic rhinitis due to allergen, Arthritis, Need for pneumococcal vaccine, and Medicare annual wellness visit, subsequent on her problem list.   Review of Systems: Negative for: fever, chest pain, shortness of breath, leg swelling, exertional dyspnea, palpitations. Current Medications:   Current Outpatient Prescriptions on File Prior to Visit   Medication Sig    albuterol (PROVENTIL HFA, VENTOLIN HFA, PROAIR HFA) 90 mcg/actuation inhaler Take 1 Puff by inhalation every four (4) hours as needed for Wheezing.  diazePAM (VALIUM) 2 mg tablet Take 0.5 to 1 tab daily prn anxiety. Use sparingly.  aspirin delayed-release 81 mg tablet TAKE 1 TABLET BY MOUTH DAILY    lactulose (CHRONULAC) 10 gram/15 mL solution Take 45 mL by mouth two (2) times daily as needed. Indications: constipation    Calcium Carbonate-Vit D3-Min (CALCIUM 600 + MINERALS) 600 mg calcium- 200 unit tab Please take 1 tab by mouth twice a day with meals     No current facility-administered medications on file prior to visit. Past Surgical History: She  has a past surgical history that includes tonsillectomy. Social and Family History: She  reports that she quit smoking about 26 years ago. She has never used smokeless tobacco. She reports that she does not drink alcohol or use illicit drugs. ; family history is not on file.   Objective: Vitals:    11/09/17 1520   BP: 131/63   Pulse: 73   Temp: 98.9 °F (37.2 °C)   TempSrc: Oral   SpO2: 100%   Weight: 109 lb (49.4 kg)    No LMP recorded. Patient is postmenopausal.   Wt Readings from Last 2 Encounters:   11/09/17 109 lb (49.4 kg)   11/06/17 111 lb (50.3 kg)     No results found for any visits on 11/09/17. Physical Examination:  General appearance - well developed, no acute distress. Chest - expiratory wheezing noted, cleared with cough. Heart - regular rate and rhythm without murmurs, rubs, or gallops. Abdomen - bowel sounds present x 4, NT, ND. Extremities - pulses intact. No peripheral edema. Assessment/Plan:   Diagnoses and all orders for this visit:    1. Cough      Follow-up Disposition:  Return for next week after the CT to review results. Flaca Enrique, ADRIAN, FNP-BC, BC-ADM  Denae Gillespie expressed understanding of this plan.

## 2017-12-01 DIAGNOSIS — I87.2 VENOUS STASIS DERMATITIS OF RIGHT LOWER EXTREMITY: ICD-10-CM

## 2017-12-02 ENCOUNTER — HOSPITAL ENCOUNTER (OUTPATIENT)
Dept: CT IMAGING | Age: 82
Discharge: HOME | End: 2017-12-02
Attending: NURSE PRACTITIONER
Payer: MEDICARE

## 2017-12-02 DIAGNOSIS — R05.9 COUGH: ICD-10-CM

## 2017-12-02 PROCEDURE — 71260 CT THORAX DX C+: CPT

## 2017-12-02 PROCEDURE — 74011000258 HC RX REV CODE- 258: Performed by: RADIOLOGY

## 2017-12-02 PROCEDURE — 74011636320 HC RX REV CODE- 636/320: Performed by: RADIOLOGY

## 2017-12-02 RX ORDER — SODIUM CHLORIDE 0.9 % (FLUSH) 0.9 %
10 SYRINGE (ML) INJECTION
Status: DISPENSED | OUTPATIENT
Start: 2017-12-02 | End: 2017-12-02

## 2017-12-02 RX ADMIN — IOPAMIDOL 100 ML: 612 INJECTION, SOLUTION INTRAVENOUS at 11:16

## 2017-12-02 RX ADMIN — SODIUM CHLORIDE 100 ML: 900 INJECTION, SOLUTION INTRAVENOUS at 11:16

## 2017-12-03 NOTE — PROGRESS NOTES
I would like for you to see one of our physicians (doctors with a MD) for a follow up appointment to discuss test results. Someone from the clinic will call you to schedule this appointment.

## 2017-12-04 RX ORDER — ASPIRIN 81 MG/1
TABLET ORAL
Qty: 180 TAB | Refills: 0 | Status: SHIPPED | OUTPATIENT
Start: 2017-12-04 | End: 2018-03-13 | Stop reason: SDUPTHER

## 2017-12-07 NOTE — PROGRESS NOTES
Attempted to reach Mar Low, patient's grandson on PHI about scheduling the appointment, no answer, did leave a vm to call sjo and speak to a nurse about scheduling a f/u with one of our MDs.

## 2017-12-14 ENCOUNTER — OFFICE VISIT (OUTPATIENT)
Dept: FAMILY MEDICINE CLINIC | Age: 82
End: 2017-12-14

## 2017-12-14 VITALS
BODY MASS INDEX: 21.48 KG/M2 | TEMPERATURE: 98.5 F | HEART RATE: 72 BPM | DIASTOLIC BLOOD PRESSURE: 38 MMHG | SYSTOLIC BLOOD PRESSURE: 104 MMHG | WEIGHT: 110 LBS

## 2017-12-14 DIAGNOSIS — J47.9 BRONCHIECTASIS WITHOUT COMPLICATION (HCC): ICD-10-CM

## 2017-12-14 DIAGNOSIS — R91.8 PULMONARY NODULES/LESIONS, MULTIPLE: ICD-10-CM

## 2017-12-14 DIAGNOSIS — I31.39 PERICARDIAL EFFUSION: Primary | ICD-10-CM

## 2017-12-14 NOTE — PROGRESS NOTES
Coordination of Care  1. Have you been to the ER, urgent care clinic since your last visit? Hospitalized since your last visit? No    2. Have you seen or consulted any other health care providers outside of the [address] [place] [address] since your last visit? Include any pap smears or colon screening. No    Medications  Does the patient need refills? NO    Learning Assessment Complete?  yes

## 2017-12-14 NOTE — MR AVS SNAPSHOT
Visit Information Date & Time Provider Department Dept. Phone Encounter #  
 12/14/2017 10:10 AM Ivon Alfaro, [address] [place] [address] Avenue [phone] [id number] Follow-up Instructions Return for soon for cardiology; you will receive call about bronchoscope test. Upcoming Health Maintenance Date Due ZOSTER VACCINE AGE 60> 9/22/1992 OSTEOPOROSIS SCREENING (DEXA) 11/22/1997 Pneumococcal 65+ Low/Medium Risk (2 of 2 - PCV13) 1/13/2017 MEDICARE YEARLY EXAM 3/10/2017 GLAUCOMA SCREENING Q2Y 7/8/2017 Influenza Age 5 to Adult 8/1/2017 DTaP/Tdap/Td series (2 - Td) 10/2/2025 Allergies as of 12/14/2017  Review Complete On: 12/14/2017 By: Ivon Alfaro NP No Known Allergies Current Immunizations  Reviewed on 1/13/2016 Name Date Influenza Vaccine 10/9/2015 Pneumococcal Polysaccharide (PPSV-23) 1/13/2016 Tdap 10/2/2015 Not reviewed this visit You Were Diagnosed With   
  
 Codes Comments Pericardial effusion    -  Primary ICD-10-CM: I31.3 ICD-9-CM: 423.9 Pulmonary nodules/lesions, multiple     ICD-10-CM: R91.8 ICD-9-CM: 793.19 Bron[facility]ctasis without complication ([facility]     WYK-35-WJ: J47.9 ICD-9-CM: 494.0 Vitals BP Pulse Temp Weight(growth percentile) BMI OB Status (!) 104/38 (BP 1 Location: Left arm, BP Patient Position: Sitting) 72 98.5 °F (36.9 °C) (Oral) 110 lb (49.9 kg) 21.48 kg/m2 Postmenopausal  
 Smoking Status Former Smoker Vitals History BMI and BSA Data Body Mass Index Body Surface Area  
 21.48 kg/m 2 1.45 m 2 [facility] Name Phone Edmund Gonsalez [phone] Your Updated Medication List  
  
   
This list is accurate as of: 12/14/17 10:51 AM.  Always use your most recent med list.  
  
  
  
  
 albuterol 90 mcg/actuation inhaler Commonly known as:  PROVENTIL HFA, VENTOLIN HFA, PROAIR HFA Take 1 Puff by inhalation every four (4) hours as needed for Wheezing. aspirin delayed-release 81 mg tablet TAKE 1 TABLET BY MOUTH EVERY DAY Calcium Carbonate-Vit D3-Min 600 mg calcium- 200 unit Tab Commonly known as:  CALCIUM 600 + MINERALS Please take 1 tab by mouth twice a day with meals  
  
 diazePAM 2 mg tablet Commonly known as:  VALIUM Take 0.5 to 1 tab daily prn anxiety. Use sparingly. lactulose 10 gram/15 mL solution Commonly known as:  Kam Sensing Take 45 mL by mouth two (2) times daily as needed. Indications: constipation We Performed the Following AMB POC EKG ROUTINE W/ 12 LEADS, INTER & REP [47861 CPT(R)] REFERRAL TO CARDIOLOGY [SWD74 Custom] Comments:  
 Pericardial effusion, low voltage EKG Follow-up Instructions Return for soon for cardiology; you will receive call about bronchoscope test.  
  
To-Do List   
 12/14/2017 MD Charge:  MD EVAL OF BRONCHOSPASM,PROLONGED   
  
 12/14/2017 Microbiology:  QUANTIFERON TB GOLD Informacion de [employer] Codigo de Referencia Referido por Referido a  
  
 [id number] TANESHA GAYLE No disponible Visitas Estado [facility] de inicio [facility] final  
 1 New Request 12/14/17 12/14/18 Si canseco referencia tiene un estado de \"pending review\" o \"denied\" , informacion adicional sera enviada para apoyar el resultado de esta decision. Patient Instructions Make appointment for lab visit for \"Quantiferon Gold\" blood test to test for tuberculosis. Make appointment with cardiology to check your heart. You will receive a call to schedule your bronchoscopy (test of your bronchiole tube). If you do not receive a call by Monday, please call them at [phone]. Introducing [facility] & HEALTH SERVICES! Estimado Abril : 
Gabino por solicitar angeles cuenta de MyChart usted.  Nuestros registros indican que usted ya tiene angeles cuenta [facility]. Usted puede acceder a canseco cuenta en cualquier momento en [email or web address] Sabía usted que usted puede acceder a canseco hospital y las instrucciones de claudia ER en cualquier momento en MyChart ? También puede revisar [facility] de las pruebas de canseco hospitalización o visita a urgencias . Información Adicional 
 
Si tiene alguna pregunta , por favor visite la sección de preguntas frecuentes del sitio web "[employer]hart en [email or web address] . Recuerde, [employer] [place] NO es que se utilizará para las necesidades urgentes. Para emergencias médicas , llame al 911 . Ahora disponible en canseco iPhone y Android ! Por favor proporcione jason resumen de la documentación de cuidado a canseco próximo proveedor. Your primary care clinician is listed as Aura Baltazar. If you have any questions after today's visit, please call [phone].

## 2017-12-14 NOTE — PATIENT INSTRUCTIONS
Make appointment for lab visit for \"Quantiferon Gold\" blood test to test for tuberculosis. Make appointment with cardiology to check your heart. You will receive a call to schedule your bronchoscopy (test of your bronchiole tube). If you do not receive a call by Monday, please call them at [phone].

## 2017-12-14 NOTE — PROGRESS NOTES
Assessment/Plan:       ICD-10-CM ICD-9-CM    1. Pericardial effusion I31.3 423.9 AMB POC EKG ROUTINE W/ 12 LEADS, INTER & REP      REFERRAL TO CARDIOLOGY   2. Pulmonary nodules/lesions, multiple R91.8 793.19 UT EVAL OF BRONCHOSPASM,PROLONGED      QUANTIFERON TB GOLD   3. Bronchiectasis without complication ([facility] P35.7 494.0 UT EVAL OF BRONCHOSPASM,PROLONGED      QUANTIFERON TB GOLD   Cardiology, Sangita Elmore, Bronchoscopy. EKG today showed low voltage. Discussed with Dr. Mayte Ortega. [employer] [id number] [facility] [place], [address]  [facility] [address]  Phone: [phone] Fax: [phone]      [facility]  Subjective:     Chief Complaint   Patient presents with    Follow-up     For CT results    Gunnar Vizcarra is a 80 y.o. OTHER female. HPI:bronchoscope, ekg, cardiology, quant gold. If we do not have quantiferon gold, options are to place PPD today and return for reading on Saturday or have her return when we have quant gold supplies to have the test drawn. She  has a past medical history of Arthritis and HTN (hypertension). She has Elevated BP, Cough, Allergic rhinitis due to allergen, Arthritis, Need for pneumococcal vaccine, and Medicare annual wellness visit, subsequent on her problem list.   Review of Systems: Negative for: fever, chest pain, shortness of breath, leg swelling, exertional dyspnea, palpitations, paroxysmal nocturnal dyspnea. Current Medications:   Current Outpatient Prescriptions on File Prior to Visit   Medication Sig    aspirin delayed-release 81 mg tablet TAKE 1 TABLET BY MOUTH EVERY DAY    albuterol (PROVENTIL HFA, VENTOLIN HFA, PROAIR HFA) 90 mcg/actuation inhaler Take 1 Puff by inhalation every four (4) hours as needed for Wheezing.  diazePAM (VALIUM) 2 mg tablet Take 0.5 to 1 tab daily prn anxiety. Use sparingly.     lactulose (CHRONULAC) 10 gram/15 mL solution Take 45 mL by mouth two (2) times daily as needed. Indications: constipation    Calcium Carbonate-Vit D3-Min (CALCIUM 600 + MINERALS) 600 mg calcium- 200 unit tab Please take 1 tab by mouth twice a day with meals     No current facility-administered medications on file prior to visit. Past Surgical History: She  has a past surgical history that includes tonsillectomy. Social and Family History: She  reports that she quit smoking about 26 years ago. She has never used smokeless tobacco. She reports that she does not drink alcohol or use illicit drugs. ; family history is not on file. Objective:     Vitals:    12/14/17 1003   BP: (!) 104/38   Pulse: 72   Temp: 98.5 °F (36.9 °C)   TempSrc: Oral   Weight: 110 lb (49.9 kg)    No LMP recorded. Patient is postmenopausal.   Wt Readings from Last 2 Encounters:   12/14/17 110 lb (49.9 kg)   11/09/17 109 lb (49.4 kg)     No results found for any visits on 12/14/17. Physical Examination:  General appearance - well developed, no acute distress. Chest - exp wheezing noted left base, clears with cough. Heart - regular rate and rhythm without murmurs, rubs, or gallops. Abdomen - bowel sounds present x 4, NT, ND. Extremities - pulses intact. No peripheral edema. Assessment/Plan:   Diagnoses and all orders for this visit:    1. Pericardial effusion  -     AMB POC EKG ROUTINE W/ 12 LEADS, INTER & REP  -     REFERRAL TO CARDIOLOGY    2. Pulmonary nodules/lesions, multiple  -     VT EVAL OF BRONCHOSPASM,PROLONGED; Future  -     QUANTIFERON TB GOLD; Future    3. Bronchiectasis without complication ([address]  -     VT EVAL OF BRONCHOSPASM,PROLONGED; Future  -     QUANTIFERON TB GOLD; Future  Cardiology, Quant Gold, Bronchoscopy. EKG today showed low voltage. Sean David, DNP, FNP-BC, BC-ADM  Sami Morel expressed understanding of this plan.

## 2017-12-15 NOTE — PROGRESS NOTES
Besides cardiology consult, this pt, because of pulmonary findings, should have pulmonary consult, ?confirmation of M avium.

## 2017-12-19 ENCOUNTER — DOCUMENTATION ONLY (OUTPATIENT)
Dept: FAMILY MEDICINE CLINIC | Age: 82
End: 2017-12-19

## 2017-12-20 ENCOUNTER — CLINICAL SUPPORT (OUTPATIENT)
Dept: FAMILY MEDICINE CLINIC | Age: 82
End: 2017-12-20

## 2017-12-20 DIAGNOSIS — M19.90 ARTHRITIS: Primary | ICD-10-CM

## 2017-12-20 NOTE — PROGRESS NOTES
Pt. Unable to perform  test in  the clinic . Pt.  Was referred to [address] [place] [address] for her Blood  drawn today ( 12/20/17)  Sergio Garza

## 2017-12-21 DIAGNOSIS — A31.0 PULMONARY MYCOBACTERIUM AVIUM COMPLEX (MAC) INFECTION (HCC): Primary | ICD-10-CM

## 2017-12-23 LAB
ANNOTATION COMMENT IMP: NORMAL
GAMMA INTERFERON BACKGROUND BLD IA-ACNC: 0.04 IU/ML
M TB IFN-G BLD-IMP: NEGATIVE
M TB IFN-G CD4+ BCKGRND COR BLD-ACNC: 0.11 IU/ML
M TB IFN-G CD4+ T-CELLS BLD-ACNC: 0.15 IU/ML
MITOGEN IGNF BLD-ACNC: 5.9 IU/ML
QUANTIFERON INCUBATION: NORMAL
SERVICE CMNT-IMP: NORMAL

## 2018-01-19 ENCOUNTER — OFFICE VISIT (OUTPATIENT)
Dept: FAMILY MEDICINE CLINIC | Age: 83
End: 2018-01-19

## 2018-01-19 DIAGNOSIS — Z71.89 COUNSELING AND COORDINATION OF CARE: Primary | ICD-10-CM

## 2018-01-22 ENCOUNTER — HOSPITAL ENCOUNTER (OUTPATIENT)
Dept: LAB | Age: 83
Discharge: HOME | End: 2018-01-22

## 2018-01-22 ENCOUNTER — OFFICE VISIT (OUTPATIENT)
Dept: FAMILY MEDICINE CLINIC | Age: 83
End: 2018-01-22

## 2018-01-22 VITALS
WEIGHT: 114 LBS | BODY MASS INDEX: 22.38 KG/M2 | SYSTOLIC BLOOD PRESSURE: 121 MMHG | HEART RATE: 68 BPM | HEIGHT: 60 IN | DIASTOLIC BLOOD PRESSURE: 51 MMHG | TEMPERATURE: 97.8 F

## 2018-01-22 DIAGNOSIS — I31.39 PERICARDIAL EFFUSION: ICD-10-CM

## 2018-01-22 DIAGNOSIS — I31.39 PERICARDIAL EFFUSION: Primary | ICD-10-CM

## 2018-01-22 LAB
T4 FREE SERPL-MCNC: 0.9 NG/DL (ref 0.8–1.5)
TSH SERPL DL<=0.05 MIU/L-ACNC: 2.33 UIU/ML (ref 0.36–3.74)

## 2018-01-22 PROCEDURE — 84439 ASSAY OF FREE THYROXINE: CPT | Performed by: FAMILY MEDICINE

## 2018-01-22 PROCEDURE — 84443 ASSAY THYROID STIM HORMONE: CPT | Performed by: FAMILY MEDICINE

## 2018-01-22 RX ORDER — BENZONATATE 100 MG/1
CAPSULE ORAL
Qty: 30 CAP | Refills: 2 | Status: SHIPPED | OUTPATIENT
Start: 2018-01-22 | End: 2018-03-13 | Stop reason: SDUPTHER

## 2018-01-22 NOTE — PROGRESS NOTES
Coordination of Care  1. Have you been to the ER, urgent care clinic since your last visit? Hospitalized since your last visit? No    2. Have you seen or consulted any other health care providers outside of the [address] [place] [address] since your last visit? Include any pap smears or colon screening. No    Medications  Does the patient need refills? YES    Learning Assessment Complete?  yes

## 2018-01-22 NOTE — MR AVS SNAPSHOT
[address] [address] [phone] Patient: Kev Goldberg MRN: [id number] CXX:[id number] Visit Information Date & Time Provider Department Dept. Phone Encounter #  
 1/22/2018  9:10 AM Tavo Benitez, [address] [place] [address] [phone] [id number] Follow-up Instructions Return in about 4 weeks (around 2/19/2018). Upcoming Health Maintenance Date Due ZOSTER VACCINE AGE 60> 9/22/1992 OSTEOPOROSIS SCREENING (DEXA) 11/22/1997 Pneumococcal 65+ Low/Medium Risk (2 of 2 - PCV13) 1/13/2017 MEDICARE YEARLY EXAM 3/10/2017 GLAUCOMA SCREENING Q2Y 7/8/2017 Influenza Age 5 to Adult 8/1/2017 DTaP/Tdap/Td series (2 - Td) 10/2/2025 Allergies as of 1/22/2018  Review Complete On: 1/22/2018 By: Carmelina Garcia No Known Allergies Current Immunizations  Reviewed on 1/13/2016 Name Date Influenza Vaccine 10/9/2015 Pneumococcal Polysaccharide (PPSV-23) 1/13/2016 Tdap 10/2/2015 Not reviewed this visit You Were Diagnosed With   
  
 Codes Comments Pericardial effusion    -  Primary ICD-10-CM: I31.3 ICD-9-CM: 423.9 Vitals BP Pulse Temp Height(growth percentile) Weight(growth percentile) BMI  
 121/51 (BP 1 Location: Left arm) 68 97.8 °F (36.6 °C) (Oral) 4' 11.61\" (1.514 m) 114 lb (51.7 kg) 22.56 kg/m2 OB Status Smoking Status Postmenopausal Former Smoker Vitals History BMI and BSA Data Body Mass Index Body Surface Area  
 22.56 kg/m 2 1.47 m 2 [facility] Name Phone [address] [phone] Your Updated Medication List  
  
   
This list is accurate as of: 1/22/18  9:36 AM.  Always use your most recent med list.  
  
  
  
  
 aspirin delayed-release 81 mg tablet TAKE 1 TABLET BY MOUTH EVERY DAY  
  
 benzonatate 100 mg capsule Commonly known as:  TESSALON  
1-2 tid prn cough Recetas Enviado a la [place] Refills  
 benzonatate (TESSALON) 100 mg capsule 2 Si-2 tid prn cough Class: Normal  
 [facility] [address]  #: [phone] Follow-up Instructions Return in about 4 weeks (around 2018). Introducing [facility] & HEALTH SERVICES! Estimado Abril : 
Gabino por solicitar angeles cuenta de MyChart usted. Nuestros registros indican que usted ya tiene angeles cuenta [facility]. Usted puede acceder a canseco cuenta en cualquier momento en [email or web address] Sabía usted que usted puede acceder a canseco hospital y las instrucciones de claudia ER en cualquier momento en MyChart ? También puede revisar [facility] de las pruebas de canseco hospitalización o visita a urgencias . Información Adicional 
 
Si tiene alguna pregunta , por favor visite la sección de preguntas frecuentes del sitio web MyChart en [email or web address] . Recuerde, MyChart NO es que se utilizará para las necesidades urgentes. Para emergencias médicas , llame al 911 . Ahora disponible en canseco iPhone y Android ! Por favor proporcione jason resumen de la documentación de cuidado a canseco próximo proveedor. Your primary care clinician is listed as Shashank Wang. If you have any questions after today's visit, please call [phone].

## 2018-01-22 NOTE — PROGRESS NOTES
HISTORY OF PRESENT ILLNESS  Thu Floyd is a 80 y.o. female. HPI Comments: Here for quant gold results, which were neg. Had chest CT showing parenymal and pleural nodularity, bronchiectasis, and complex pericardial effusion. Pt knew about the pulmonary findings but didn't know about the cardiac finding. She met with AN 3 days ago for pulmionary referral, but no cardiology referral had been put in the chart. She reports occas sscp that is not exertional and has a hard time characterizing it. Sleeps on 2 pillows, but not because she has difficulty breathing lying flat. Reports long h/o cough productive of a large amount of sputum, mostly yellow. Shahida Starr is here with her and reports she has been treated multiple times with antibiotics by her previous doctor. , and has taken many otc meds which haven't helped. Albuterol irritated her lungs. She denies sob or fever. She grew up in [place] but has been in this country for over 50 years. Only med she is taking is 81mg asa. Follow-up         ROS    Physical Exam   Constitutional: She appears well-developed and well-nourished. No distress. Very kyphotic. Neck: No thyromegaly present. Cardiovascular: Normal rate, regular rhythm and normal heart sounds. No rub heard. Pulmonary/Chest: She has rales. Few rales in both bases. Musculoskeletal: She exhibits no edema. ASSESSMENT and PLAN  1. Pericardial effusion with ekg showing low voltage which concurs with potentially significant effusion. Card referral, needs soon. 2.  Bronchiectasis and pulmonary nodules, not tb but could be [facility] or other infectious process. Working on pulmonary referral.  Will check sputum for AFB. rx tessalon just to help the cough while awiting work-up.

## 2018-01-24 ENCOUNTER — TELEPHONE (OUTPATIENT)
Dept: FAMILY MEDICINE CLINIC | Age: 83
End: 2018-01-24

## 2018-01-24 DIAGNOSIS — I31.39 PERICARDIAL EFFUSION: Primary | ICD-10-CM

## 2018-01-24 DIAGNOSIS — R91.8 PULMONARY NODULES: ICD-10-CM

## 2018-01-24 NOTE — TELEPHONE ENCOUNTER
Spoke to Aubree Jalloh and Ritesh Lackey to cancel out [facility] referrals for pulm and cardio. TC and messages left for pt grandson Lan and other family member Anastasia on phi to please call [facility], we have made cardiology appt with Dr Emma Mendoza for 2/2 @ 2:30pm for cardiology appt at [facility] [address], [facility], ph [phone] and appt w/ Dr Grady De Santiago, pulmonary, 2/27 at 9:45am arriving at 9:15am at [address] [facility], [address] [place] [address], ph [phone] to change or cancel appt. Requested a phone call back to nurse at [facility] to confirm that patient's family has these appointment dates.

## 2018-01-24 NOTE — TELEPHONE ENCOUNTER
Summer Curry returned call, all appt info for pulm and cardio given to David Wade for pt appts. Explained to David Wade that patient can be taken to a [facility] near them for the AFB cultures ordered by Dr Aidan Conteh. Printing the ordered and faxing to [facility] at [address] [facility] [phone] fax [phone]. David Wade plans to take the patient to [facility] tomorrow 1/25/18.

## 2018-02-02 ENCOUNTER — OFFICE VISIT (OUTPATIENT)
Dept: CARDIOLOGY CLINIC | Age: 83
End: 2018-02-02

## 2018-02-02 VITALS
DIASTOLIC BLOOD PRESSURE: 72 MMHG | HEIGHT: 59 IN | WEIGHT: 109 LBS | BODY MASS INDEX: 21.97 KG/M2 | RESPIRATION RATE: 20 BRPM | OXYGEN SATURATION: 96 % | SYSTOLIC BLOOD PRESSURE: 136 MMHG | HEART RATE: 66 BPM

## 2018-02-02 DIAGNOSIS — R03.0 PREHYPERTENSION: ICD-10-CM

## 2018-02-02 DIAGNOSIS — R07.89 ATYPICAL CHEST PAIN: Primary | ICD-10-CM

## 2018-02-02 DIAGNOSIS — R05.9 COUGH: ICD-10-CM

## 2018-02-02 DIAGNOSIS — I31.39 IDIOPATHIC PERICARDIAL EFFUSION: ICD-10-CM

## 2018-02-02 DIAGNOSIS — Z87.891 FORMER SMOKER: ICD-10-CM

## 2018-02-02 DIAGNOSIS — J47.0: ICD-10-CM

## 2018-02-02 PROBLEM — R07.9 CHEST PAIN IN ADULT: Status: ACTIVE | Noted: 2018-02-02

## 2018-02-02 NOTE — PROGRESS NOTES
[address] [facility] [address] [place] [address], [address] [facility]                                 NEW PATIENT HPI/FOLLOW-UP    NAME:  Glenna Barrios   :   1932   MRN:   [id number]   DATE:             2018  PCP:  Ricki Hernandez NP           Subjective: The patient is a 80y.o. year old [address] female former smoker born and raised in 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 Patient admits to intermittent productive chronic cough of thick whitish,yellowish sputum/TINOCO/atypical L>R costochondral sharp chest pain at anytime but usually when coughing, moving upper torso and lying on sides. Has hx of recurring bronchitis and pneumonia requiring AB rx over the yrs worse over last few months. Admits to smoking several cigarettes/day early yrs over 20-30 yrs. Grew up in [place] [address] [facility] and bird nests. Denies edema, medication intolerance, palpitations, PND/orthopnea wheezing, syncope, dizziness or light headedness.       Review of Systems:     [] Unable to obtain  ROS due to  []mental status change  []sedated   []intubated   [x]Total of 12 systems reviewed as follows:  Constitutional: negative fever, negative chills, negative weight loss  Eyes:   negative visual changes  ENT:   negative sore throat, tongue or lip swelling  Chest/Resp:  +cough, wheezing, dyspnea,tenderness  Cards:  +chest pain, decreased exercise endurance, -palpitations, lower extremity edema,PND,orthopnea,syncpoe,dizziness,lightheadedness  GI:   negative for nausea, vomiting, diarrhea, and abdominal pain  :  negative for frequency, dysuria  Integument:  negative for rash and pruritus  Heme:  negative for easy bruising and gum/nose bleeding  Musculoskel: negative for myalgias,  back pain and muscle weakness  Neuro:  negative for headaches, dizziness, vertigo  Psych:  +anxiety, -depression     Past Medical History:   Diagnosis Date    Arthritis     HTN (hypertension)      Patient Active Problem List    Diagnosis Date Noted    Pericardial effusion 02/02/2018    Medicare annual wellness visit, subsequent 03/10/2016    Cough 01/13/2016    Allergic rhinitis due to allergen 01/13/2016    Arthritis 01/13/2016    Need for pneumococcal vaccine 01/13/2016    Prehypertension 07/08/2015      Past Surgical History:   Procedure Laterality Date    HX TONSILLECTOMY       No Known Allergies   Family History   Problem Relation Age of Onset    No Known Problems Mother     No Known Problems Father       Social History     Social History    Marital status: SINGLE     Spouse name: N/A    Number of children: N/A    Years of education: N/A     Occupational History    Not on file. Social History Main Topics    Smoking status: Former Smoker     Quit date: 7/8/1991    Smokeless tobacco: Never Used    Alcohol use No    Drug use: No    Sexual activity: Not on file     Other Topics Concern    Not on file     Social History Narrative      Current Outpatient Prescriptions   Medication Sig    benzonatate (TESSALON) 100 mg capsule 1-2 tid prn cough    aspirin delayed-release 81 mg tablet TAKE 1 TABLET BY MOUTH EVERY DAY (Patient taking differently: TAKE 1 TABLET BY bid)     No current facility-administered medications for this visit. I have reviewed the nurses notes, vitals, problem list, allergy list, medical history, family medical, social history and medications.         Objective:     Physical Exam:     Vitals:    02/02/18 1456 02/02/18 1514 02/02/18 1516 02/02/18 1517   BP: 140/75 131/70 133/75 136/72   Pulse: 64 60 65 66   Resp: 20      SpO2: 96%      Weight: 109 lb (49.4 kg) Height: 4' 11\" (1.499 m)       Body mass index is 22.02 kg/(m^2). General: Well developed,frail HW in no acute distress. HEENT: No carotid bruits, no JVD, trach is midline. Heart:  Normal S1/S2 negative S3 or S4. Regular, no murmur, gallop or rub.   Respiratory: Clear bilaterally, no wheezing or rales  Abdomen:   Soft, non-tender, bowel sounds are active.   Extremities:  No edema, normal cap refill, no cyanosis. Neuro: A&Ox3, speech clear, gait stable. Skin: Skin color is normal. No rashes or lesions. No diaphoresis. Vascular: 2+ pulses symmetric in all extremities        Data Review:       Cardiographics:    EKG: NSR,low limb lead    Cardiology Labs:    No results found for this or any previous visit. Lab Results   Component Value Date/Time    Cholesterol, total 146 06/02/2016 09:21 AM    HDL Cholesterol 69 06/02/2016 09:21 AM    LDL, calculated 65 06/02/2016 09:21 AM    Triglyceride 58 06/02/2016 09:21 AM    CHOL/HDL Ratio 3.1 10/02/2015 10:33 AM       Lab Results   Component Value Date/Time    Sodium 136 10/19/2017 11:17 AM    Potassium 4.2 10/19/2017 11:17 AM    Chloride 100 10/19/2017 11:17 AM    CO2 29 10/19/2017 11:17 AM    Anion gap 7 10/19/2017 11:17 AM    Glucose 92 10/19/2017 11:17 AM    BUN 13 10/19/2017 11:17 AM    Creatinine 0.71 10/19/2017 11:17 AM    BUN/Creatinine ratio 18 10/19/2017 11:17 AM    GFR est AA >60 10/19/2017 11:17 AM    GFR est non-AA >60 10/19/2017 11:17 AM    Calcium 8.1 10/19/2017 11:17 AM    Bilirubin, total 0.3 10/19/2017 11:17 AM    AST (SGOT) 19 10/19/2017 11:17 AM    Alk. phosphatase 115 10/19/2017 11:17 AM    Protein, total 6.7 10/19/2017 11:17 AM    Albumin 3.5 10/19/2017 11:17 AM    Globulin 3.2 10/19/2017 11:17 AM    A-G Ratio 1.1 10/19/2017 11:17 AM    ALT (SGPT) 23 10/19/2017 11:17 AM          Assessment:       ICD-10-CM ICD-9-CM    1.  Atypical chest pain--costochondral R07.89 786.59 AMB POC EKG ROUTINE W/ 12 LEADS, INTER & REP      2D ECHO COMPLETE ADULT (TTE) W OR WO CONTR   2. Idiopathic pericardial effusion-small I31.3 423.9    3. Prehypertension R03.0 796.2 AMB POC EKG ROUTINE W/ 12 LEADS, INTER & REP      2D ECHO COMPLETE ADULT (TTE) W OR WO CONTR   4. Cough R05 786.2    5. Subacute bronchitis with bronchiectasis (HCC) J47.1 494.1    6. Former smoker Z87.891 V15.82          Discussion: Patient presents at this time for evaluation of small complex pericardial effusion revealed via CT angio likely secondary to chronic recurring bronchitis,bronchiectasis. Will obtain Echo to reassess pericardial effusion. No evidence of tamponade by hx and exam. Chest pain atypical and non-cardiac. Reassured. Would benefit from Pulmonary consultation. Plan: 1. Continue same meds. Lipid profile and labs followed by PCP. 2.Encouraged to exercise to tolerance and follow low fat, low cholesterol, low sodium predominantly Plant-based (consider Mediterranean) diet. Call with questions or concerns. Will follow up any test results by phone and/or f/u here in office if needed. [place] Puls 3.Follow up: 6 months or sooner if needed. I have discussed the diagnosis with the patient and the intended plan as seen in the above orders. The patient has received an after-visit summary and questions were answered concerning future plans. I have discussed any concerning medication side effects and warnings with the patient as well.     Robinson Solano MD

## 2018-02-02 NOTE — MR AVS SNAPSHOT
Tim Hess 
 
 
 [address] 
[phone] Patient: Dahiana Mckinney MRN: [id number] QXS:[id number]/45/4545 Visit Information Date & Time Provider Department Dept. Phone Encounter #  
 2/2/2018  2:30 PM MD Alexei Pleitez [facility] [place] [address] [id number] Your Appointments 2/23/2018 10:30 AM  
ROUTINE CARE with Ghanshyam Payne MD  
[facility] [address]) Appt Note: fu  1  kendra pleural effusion 651 [facility] [address]  
  
   
 1516 [facility] [address] 3/13/2018 10:50 AM  
ESTABLISHED PATIENT with MD Alexei Pleitez [facility]) Appt Note: 1  MO. F/U ECHO TEST  
 [address] 
[address]  
[phone] [address] Upcoming Health Maintenance Date Due ZOSTER VACCINE AGE 60> 9/22/1992 OSTEOPOROSIS SCREENING (DEXA) 11/22/1997 Pneumococcal 65+ Low/Medium Risk (2 of 2 - PCV13) 1/13/2017 MEDICARE YEARLY EXAM 3/10/2017 GLAUCOMA SCREENING Q2Y 7/8/2017 Influenza Age 5 to Adult 8/1/2017 DTaP/Tdap/Td series (2 - Td) 10/2/2025 Allergies as of 2/2/2018  Review Complete On: 2/2/2018 By: Vicente Luna LPN No Known Allergies Current Immunizations  Reviewed on 1/13/2016 Name Date Influenza Vaccine 10/9/2015 Pneumococcal Polysaccharide (PPSV-23) 1/13/2016 Tdap 10/2/2015 Not reviewed this visit You Were Diagnosed With   
  
 Codes Comments Chest pain in adult    -  Primary ICD-10-CM: R07.9 ICD-9-CM: 786.50 Prehypertension     ICD-10-CM: R03.0 ICD-9-CM: 796.2 Pericardial effusion     ICD-10-CM: I31.3 ICD-9-CM: 423.9 Vitals BP Pulse Resp Height(growth percentile) Weight(growth percentile) SpO2  
 136/72 (BP 1 Location: Left arm, BP Patient Position: Standing) 66 20 4' 11\" (1.499 m) 109 lb (49.4 kg) 96% BMI OB Status Smoking Status 22.02 kg/m2 Postmenopausal Former Smoker Vitals History BMI and BSA Data Body Mass Index Body Surface Area 22.02 kg/m 2 1.43 m 2 [facility] Name Phone Edmund Gonsalez [phone] Your Updated Medication List  
  
   
This list is accurate as of: 2/2/18  3:56 PM.  Always use your most recent med list.  
  
  
  
  
 aspirin delayed-release 81 mg tablet TAKE 1 TABLET BY MOUTH EVERY DAY  
  
 benzonatate 100 mg capsule Commonly known as:  TESSALON  
1-2 tid prn cough We Performed the Following AMB POC EKG ROUTINE W/ 12 LEADS, INTER & REP [37297 CPT(R)] To-Do List   
 02/08/2018 ECHO:  2D ECHO COMPLETE ADULT (TTE) W OR WO CONTR Introducing [facility]! Estimado Abril : 
Gabino por solicitar angeles cuenta de MyChart usted. Nuestros registros indican que usted ya tiene angeles cuenta MyChart [place]. Usted puede acceder a canseco cuenta en cualquier momento en [email or web address]. Dial a Dealer/mychart Sabía usted que usted puede acceder a canseco hospital y las instrucciones de claudia ER en cualquier momento en MyChart ? También puede revisar [facility] de las pruebas de canseco hospitalización o visita a urgencias . Información Adicional 
 
Si tiene alguna pregunta , por favor visite la sección de preguntas frecuentes del sitio web MyChart en [email or web address]. Dial a Dealer/[email or web address]/ . Recuerde, MyChart NO es que se utilizará para las necesidades urgentes. Para emergencias médicas , llame al 911 . Ahora disponible en canseco iPhone y Android !  
  
  
 Por favor proporcione jason resumen de la documentación de cuidado a canseco próximo proveedor. Your primary care clinician is listed as [facility]. If you have any questions after today's visit, please call [phone].

## 2018-02-02 NOTE — PROGRESS NOTES
Chief Complaint   Patient presents with    New Patient     pt c/o sob , chest pain, poor circulation in leg bilateral , swelling leg bilateral, chronic cough,dizziness,lethargy     1. Have you been to the ER, urgent care clinic since your last visit? Hospitalized since your last visit? No    2. Have you seen or consulted any other health care providers outside of the [address] [place] [address] since your last visit? Include any pap smears or colon screening.  No

## 2018-02-04 PROBLEM — Z87.891 FORMER SMOKER: Status: ACTIVE | Noted: 2018-02-04

## 2018-02-04 PROBLEM — J47.0: Status: ACTIVE | Noted: 2018-02-04

## 2018-03-12 LAB
ACID FAST STN SPEC: NEGATIVE
MYCOBACTERIUM SPEC QL CULT: NORMAL
SPECIMEN PREPARATION: NORMAL
SPECIMEN STATUS REPORT, ROLRST: NORMAL

## 2018-03-13 ENCOUNTER — OFFICE VISIT (OUTPATIENT)
Dept: CARDIOLOGY CLINIC | Age: 83
End: 2018-03-13

## 2018-03-13 ENCOUNTER — TELEPHONE (OUTPATIENT)
Dept: CARDIOLOGY CLINIC | Age: 83
End: 2018-03-13

## 2018-03-13 DIAGNOSIS — I87.2 VENOUS STASIS DERMATITIS OF RIGHT LOWER EXTREMITY: ICD-10-CM

## 2018-03-13 DIAGNOSIS — I31.39 IDIOPATHIC PERICARDIAL EFFUSION: Primary | ICD-10-CM

## 2018-03-13 NOTE — TELEPHONE ENCOUNTER
Spoke with pt  Sixto/ Cj burnham. Verified 2 identifers. Pt call requesting echo be done at [facility]. Told pt someone from central scheduling will reach out to set up appt.

## 2018-03-13 NOTE — PROGRESS NOTES
1 afb neg, no others done. Can you check with her to see if she is seeing the lung doctor? We referred her but I have no notes from pulm to indicate that she went.

## 2018-03-14 RX ORDER — BENZONATATE 100 MG/1
CAPSULE ORAL
Qty: 30 CAP | Refills: 0 | Status: SHIPPED | OUTPATIENT
Start: 2018-03-14 | End: 2018-04-03 | Stop reason: SDUPTHER

## 2018-03-16 ENCOUNTER — TELEPHONE (OUTPATIENT)
Dept: FAMILY MEDICINE CLINIC | Age: 83
End: 2018-03-16

## 2018-03-16 NOTE — TELEPHONE ENCOUNTER
Attempted to call [facility], but they were closed, their number is [phone]. Unable to leave them a message. Please try again and ask for office notes to be faxed to [facility] please.  Isela Almonte RN

## 2018-03-16 NOTE — PROGRESS NOTES
Spoke to Jodi, he took patient to see Dr Guillermina Almonte who said \"there is a bacteria in her lungs that is curable but the medicine is too strong and could kill her. Best course of action is to maintain her at this point, nothing else they can do. \" Jodi will call Dr Guillermina Almonte office to get notes sent to [facility]. Will place a tel call message to the [facility] pool to get notes sent to us.  Sondra Do RN

## 2018-03-18 RX ORDER — ASPIRIN 81 MG/1
TABLET ORAL
Qty: 180 TAB | Refills: 0 | Status: SHIPPED | OUTPATIENT
Start: 2018-03-18 | End: 2018-04-03 | Stop reason: SDUPTHER

## 2018-03-26 ENCOUNTER — HOSPITAL ENCOUNTER (OUTPATIENT)
Dept: NON INVASIVE DIAGNOSTICS | Age: 83
Discharge: HOME | End: 2018-03-26
Attending: PHYSICIAN ASSISTANT
Payer: MEDICARE

## 2018-03-26 DIAGNOSIS — I31.39 IDIOPATHIC PERICARDIAL EFFUSION: ICD-10-CM

## 2018-03-26 PROCEDURE — 93306 TTE W/DOPPLER COMPLETE: CPT

## 2018-04-03 DIAGNOSIS — I87.2 VENOUS STASIS DERMATITIS OF RIGHT LOWER EXTREMITY: ICD-10-CM

## 2018-04-04 RX ORDER — BENZONATATE 100 MG/1
CAPSULE ORAL
Qty: 30 CAP | Refills: 0 | Status: SHIPPED | OUTPATIENT
Start: 2018-04-04 | End: 2018-06-22 | Stop reason: SDUPTHER

## 2018-04-19 ENCOUNTER — HOSPITAL ENCOUNTER (EMERGENCY)
Age: 83
Discharge: HOME | End: 2018-04-20
Attending: EMERGENCY MEDICINE
Payer: MEDICARE

## 2018-04-19 DIAGNOSIS — S80.811A ABRASION OF RIGHT LOWER EXTREMITY, INITIAL ENCOUNTER: Primary | ICD-10-CM

## 2018-04-19 PROCEDURE — 93971 EXTREMITY STUDY: CPT

## 2018-04-19 PROCEDURE — 99284 EMERGENCY DEPT VISIT MOD MDM: CPT

## 2018-04-19 RX ORDER — BACITRACIN 500 UNIT/G
1 PACKET (EA) TOPICAL
Status: COMPLETED | OUTPATIENT
Start: 2018-04-19 | End: 2018-04-20

## 2018-04-19 RX ORDER — ASPIRIN 81 MG/1
TABLET ORAL
Qty: 180 TAB | Refills: 0 | Status: SHIPPED | OUTPATIENT
Start: 2018-04-19 | End: 2018-06-22 | Stop reason: SDUPTHER

## 2018-04-20 ENCOUNTER — APPOINTMENT (OUTPATIENT)
Dept: GENERAL RADIOLOGY | Age: 83
End: 2018-04-20
Attending: PHYSICIAN ASSISTANT
Payer: MEDICARE

## 2018-04-20 VITALS
RESPIRATION RATE: 18 BRPM | WEIGHT: 103 LBS | DIASTOLIC BLOOD PRESSURE: 62 MMHG | HEIGHT: 62 IN | OXYGEN SATURATION: 99 % | SYSTOLIC BLOOD PRESSURE: 158 MMHG | HEART RATE: 78 BPM | TEMPERATURE: 97.6 F | BODY MASS INDEX: 18.95 KG/M2

## 2018-04-20 PROCEDURE — 74011000250 HC RX REV CODE- 250: Performed by: PHYSICIAN ASSISTANT

## 2018-04-20 PROCEDURE — 73590 X-RAY EXAM OF LOWER LEG: CPT

## 2018-04-20 RX ORDER — BACITRACIN 500 [USP'U]/G
OINTMENT TOPICAL 3 TIMES DAILY
Qty: 1 TUBE | Refills: 0 | Status: SHIPPED | OUTPATIENT
Start: 2018-04-20 | End: 2018-04-30

## 2018-04-20 RX ADMIN — BACITRACIN 1 PACKET: 500 OINTMENT TOPICAL at 00:10

## 2018-04-20 NOTE — ED NOTES
Pt given discharge instructions, patient education, prescriptions and follow-up information. Pt states understanding - all questions answered. Pt discharged to home in private vehicle, ambulatory. Pt A&Ox4, RA.

## 2018-04-20 NOTE — PROCEDURES
[address]  *** FINAL REPORT ***    Name: Genaro Apley  MRN: [id number]  : 1932  HIS Order #: [id number]  [address] [facility] Visit #: [id number]  Date: 2018    TYPE OF TEST: Peripheral Venous Testing    REASON FOR TEST  Pain in limb    Right Leg:-  Deep venous thrombosis:           No  Superficial venous thrombosis:    No  Deep venous insufficiency:        Not examined  Superficial venous insufficiency: Not examined      INTERPRETATION/FINDINGS  PROCEDURE:  Color duplex ultrasound imaging of lower extremity veins. FINDINGS:       Right: The common femoral, deep femoral, femoral, popliteal,  posterior tibial, peroneal, and great saphenous are patent and without   evidence of thrombus; each is is fully compressible and there is no  narrowing of the flow channel on color Doppler imaging. Phasic flow  is observed in the common femoral vein. Left:   The common femoral vein is patent and without evidence of   thrombus. Phasic flow is observed. This extremity was not otherwise   evaluated. IMPRESSION:  No evidence of right lower extremity vein thrombosis. ADDITIONAL COMMENTS    I have personally reviewed the data relevant to the interpretation of  this  study.     TECHNOLOGIST: Catarino Riggs RDMS  Signed: 2018 11:48 PM    PHYSICIAN: Mariola Arita MD  Signed: 2018 07:40 AM

## 2018-04-20 NOTE — ED TRIAGE NOTES
Patient presents to the emergency department reporting right lower leg pain following a fall last week. Patient reports striking her leg on a step. Patient has a hematoma to the leg and reports pain.   Patient's son reports patient has had vascular issues in the past.

## 2018-04-20 NOTE — DISCHARGE INSTRUCTIONS
Scrapes (Abrasions): Care Instructions  Your Care Instructions  Scrapes (abrasions) are wounds where your skin has been rubbed or torn off. Most scrapes do not go deep into the skin, but some may remove several layers of skin. Scrapes usually don't bleed much, but they may ooze pinkish fluid. Scrapes on the head or face may appear worse than they are. They may bleed a lot because of the good blood supply to this area. Most scrapes heal well and may not need a bandage. They usually heal within 3 to 7 days. A large, deep scrape may take 1 to 2 weeks or longer to heal. A scab may form on some scrapes. Follow-up care is a key part of your treatment and safety. Be sure to make and go to all appointments, and call your doctor if you are having problems. It's also a good idea to know your test results and keep a list of the medicines you take. How can you care for yourself at home? · If your doctor told you how to care for your wound, follow your doctor's instructions. If you did not get instructions, follow this general advice:  ¨ Wash the scrape with clean water 2 times a day. Don't use hydrogen peroxide or alcohol, which can slow healing. ¨ You may cover the scrape with a thin layer of petroleum jelly, such as Vaseline, and a nonstick bandage. ¨ Apply more petroleum jelly and replace the bandage as needed. · Prop up the injured area on a pillow anytime you sit or lie down during the next 3 days. Try to keep it above the level of your heart. This will help reduce swelling. · Be safe with medicines. Take pain medicines exactly as directed. ¨ If the doctor gave you a prescription medicine for pain, take it as prescribed. ¨ If you are not taking a prescription pain medicine, ask your doctor if you can take an over-the-counter medicine. When should you call for help?   Call your doctor now or seek immediate medical care if:  ? · You have signs of infection, such as:  ¨ Increased pain, swelling, warmth, or redness around the scrape. ¨ Red streaks leading from the scrape. ¨ Pus draining from the scrape. ¨ A fever. ? · The scrape starts to bleed, and blood soaks through the bandage. Oozing small amounts of blood is normal.   ? Watch closely for changes in your health, and be sure to contact your doctor if the scrape is not getting better each day. Where can you learn more? Go to http://navid-estefania.info/. Enter A374 in the search box to learn more about \"Scrapes (Abrasions): Care Instructions. \"  Current as of: March 20, 2017  Content Version: 11.4  © 2717-0507 [employer]. Care instructions adapted under license by [employer] (which disclaims liability or warranty for this information). If you have questions about a medical condition or this instruction, always ask your healthcare professional. [place] [address] any warranty or liability for your use of this information.

## 2018-04-20 NOTE — ED PROVIDER NOTES
HPI Comments: 81 yo female with hx of HTN and arthritis here for evaluation of right shin pain. States she struck it two weeks ago and has had pain to area since that time. +wound noted. Family states she had to seen wound clinic in past secondary to delayed healing of wounds. Pt ambulatory. Declines taking any medications for pain. Former smoker. Patient is a 80 y.o. female presenting with leg pain. The history is provided by the patient and a relative. Leg Pain    The current episode started more than 1 week ago. The problem occurs constantly. The pain is present in the left lower leg (SHIN). The pain is at a severity of 9/10. The pain is moderate. Past Medical History:   Diagnosis Date    Arthritis     HTN (hypertension)        Past Surgical History:   Procedure Laterality Date    HX TONSILLECTOMY           Family History:   Problem Relation Age of Onset    No Known Problems Mother     No Known Problems Father        Social History     Social History    Marital status: SINGLE     Spouse name: N/A    Number of children: N/A    Years of education: N/A     Occupational History    Not on file. Social History Main Topics    Smoking status: Former Smoker     Quit date: 7/8/1991    Smokeless tobacco: Never Used    Alcohol use No    Drug use: No    Sexual activity: Not on file     Other Topics Concern    Not on file     Social History Narrative         ALLERGIES: Review of patient's allergies indicates no known allergies. Review of Systems   Constitutional: Negative for activity change and fever. HENT: Negative for facial swelling. Eyes: Negative for discharge. Respiratory: Negative for cough. Cardiovascular: Negative for leg swelling. Gastrointestinal: Negative for abdominal distention. Musculoskeletal: Positive for myalgias. Skin: Positive for wound. Neurological: Negative for seizures and syncope. Psychiatric/Behavioral: Negative for behavioral problems. Vitals:    04/19/18 2235   BP: 166/71   Pulse: 81   Resp: 18   Temp: 97.4 °F (36.3 °C)   SpO2: 98%   Weight: 46.7 kg (103 lb)   Height: 5' 2\" (1.575 m)            Physical Exam   Constitutional: She is oriented to person, place, and time. She appears well-nourished. Elderly; frail   HENT:   Head: Normocephalic and atraumatic. Eyes: Pupils are equal, round, and reactive to light. Neck: Normal range of motion. Cardiovascular: Normal rate and regular rhythm. Pulmonary/Chest: Effort normal and breath sounds normal.   Abdominal: Soft. There is no tenderness. Musculoskeletal: Normal range of motion. She exhibits no edema. Right lower leg: She exhibits tenderness. She exhibits no deformity. Legs:  Neurological: She is alert and oriented to person, place, and time. Skin: Skin is warm and dry. Psychiatric: She has a normal mood and affect. Nursing note and vitals reviewed. MDM  Number of Diagnoses or Management Options  Abrasion of right lower extremity, initial encounter:      Amount and/or Complexity of Data Reviewed  Tests in the radiology section of CPT®: ordered and reviewed  Obtain history from someone other than the patient: yes  Discuss the patient with other providers: yes  Independent visualization of images, tracings, or specimens: yes          ED Course       Procedures     PT declines any medications for pain. Patient has been reassessed. Reviewed medications and radiographics with patient AND family. Ready to discharge home. Discussed case with attending Physician Bro Benjamin. Agrees with care and will D/C with follow up. Patient's results have been reviewed with them. Patient and/or family have verbally conveyed their understanding and agreement of the patient's signs, symptoms, diagnosis, treatment and prognosis and additionally agree to follow up as recommended or return to the Emergency Room should their condition change prior to follow-up.   Discharge instructions have also been provided to the patient with some educational information regarding their diagnosis as well a list of reasons why they would want to return to the ER prior to their follow-up appointment should their condition change.   SCARLETT Goyal

## 2018-06-22 DIAGNOSIS — I87.2 VENOUS STASIS DERMATITIS OF RIGHT LOWER EXTREMITY: ICD-10-CM

## 2018-06-22 NOTE — TELEPHONE ENCOUNTER
From: Rahul Ruelas  To: Scott Ballard MD  Sent: 6/22/2018 2:04 PM EDT  Subject: Medication Renewal Request    Original authorizing provider: MD Rahul Lucio would like a refill of the following medications:  benzonatate (TESSALON) 100 mg capsule Scott Ballard MD]    Preferred pharmacy: [address] [place] [address]    Comment:      Medication renewals requested in this message routed to other providers:  aspirin delayed-release 81 mg tablet [place] Peed, NP]

## 2018-06-27 RX ORDER — BENZONATATE 100 MG/1
100 CAPSULE ORAL
Qty: 30 CAP | Refills: 0 | Status: SHIPPED | OUTPATIENT
Start: 2018-06-27 | End: 2018-09-20

## 2018-06-27 NOTE — TELEPHONE ENCOUNTER
Has she had f/u with pulmonary since February? Did Dr. Lisa Goss do another chest CT in feb? We do not have the results if he did, and we do not have any info regarding any treatment of her for the cough. I only have a vague office visit note from 2/27/18. She probably needs to be seen again by me, unless she has had recent f/u with him or has appt scheduled with him. I will refill the tessalon, while she is getting appt with me or him.

## 2018-06-28 RX ORDER — ASPIRIN 81 MG/1
81 TABLET ORAL DAILY
Qty: 180 TAB | Refills: 0 | Status: SHIPPED | OUTPATIENT
Start: 2018-06-28 | End: 2018-06-28 | Stop reason: SDUPTHER

## 2018-06-28 RX ORDER — ASPIRIN 81 MG/1
81 TABLET ORAL DAILY
Qty: 180 TAB | Refills: 0 | Status: SHIPPED | OUTPATIENT
Start: 2018-06-28 | End: 2018-09-20

## 2018-06-28 NOTE — TELEPHONE ENCOUNTER
From: Dennie Commander  To:  Annie Barber NP  Sent: 6/22/2018 2:04 PM EDT  Subject: Medication Renewal Request    Original authorizing provider: Lorri Echevaria, NP Dennie Commander would like a refill of the following medications:  aspirin delayed-release 81 mg tablet Annie Barber NP]    Preferred pharmacy: [address] [place] [address]    Comment:      Medication renewals requested in this message routed to other providers:  benzonatate (TESSALON) 100 mg capsule Ayden Muniz MD]

## 2018-09-20 ENCOUNTER — HOSPITAL ENCOUNTER (OUTPATIENT)
Dept: WOUND CARE | Age: 83
Discharge: HOME | End: 2018-09-20
Payer: MEDICARE

## 2018-09-20 VITALS — HEART RATE: 66 BPM | RESPIRATION RATE: 16 BRPM | TEMPERATURE: 98.5 F

## 2018-09-20 PROCEDURE — 29581 APPL MULTLAYER CMPRN SYS LEG: CPT

## 2018-09-20 PROCEDURE — 99214 OFFICE O/P EST MOD 30 MIN: CPT

## 2018-09-20 RX ORDER — ACETAMINOPHEN 325 MG/1
325 TABLET ORAL
COMMUNITY

## 2018-09-20 RX ORDER — GABAPENTIN 100 MG/1
100 CAPSULE ORAL 3 TIMES DAILY
COMMUNITY

## 2018-09-20 NOTE — WOUND CARE
Visit Vitals  Pulse 66  Temp 98.5 °F (36.9 °C)  Resp 16  Breastfeeding No  
 
 
 09/20/18 1336 Wound Leg Lower Right Date First Assessed/Time First Assessed: 09/20/18 1335   POA: Yes  Wound Type: Venous  Location: Leg Lower  Orientation: Right DRESSING STATUS Breakthrough drainage DRESSING TYPE Gauze;Gauze wrap (melissa) Wound Length (cm) 0.1 cm Wound Width (cm) 0.1 cm Wound Depth (cm) 0.1 Wound Surface area (cm^2) 0.01 cm^2 Condition of Base Pink Drainage Amount  Large Drainage Color Yellow Wound Odor None Periwound Skin Condition Intact

## 2018-09-20 NOTE — PROGRESS NOTES
Discharge Condition:Stable Ambulatory Status:Ambulatory Discharge Destination:home Transportation: private auto Accompanied by: grandson

## 2018-09-21 NOTE — WOUND CARE
[address] [facility] H&P Assessment/Plan: Ms. Marisabel Jain is a 85F who presents with a rt leg venous ulceration (I87.331), (M44.519) - Pt evaluated and treated. - Ordered venous duplex/janet/pvr 
- Endoform/3 layer compression 
- Skin lubricant applied, discussed with pt importance of moisturizing skin and how it can help prevent   development of ulcerations and cellulitis. - I encouraged her grandson to assist as much as possible with putting a circaid on her left leg - F/U 1 week. Subjective: 
Pt complains of wound to her right leg. Previous tx include deep, compression wraps, double tubis. Negative for fever, chills, nausea, vomiting, chest pain, shortness of breath. HPI: Ms. Marisabel Jain is a 85F who presents with a wound to her right leg. She has previously been seen at the wound care center  for a venous wound on her left leg. At that time she was successfully treated with deep and compression wraps. She states that both of her legs are still swollen. She sometimes has difficulty getting into her circaids. Her grandson is able to help her on some days, but other days he is not. As a result, sometimes she does not have compression. REVIEW OF SYSTEMS:  
CONSTITUTIONAL: No fever, chills, weakness or fatigue. SKIN: right leg wound CARDIOVASCULAR: No chest pain, no palpitations, no chest discomfort RESPIRATORY: No shortness of breath, cough or sputum. GASTROINTESTINAL: No anorexia, nausea, vomiting or diarrhea. No abdominal pain or blood. NEUROLOGICAL: No headache, dizziness, syncope, paralysis, ataxia MUSCULOSKELETAL: No muscle, back pain, joint pain or stiffness. HEMATOLOGIC: No excessive bleeding, no excessive bruising. LYMPHATICS: No enlarged nodes, no palpable lymph node mases PSYCHIATRIC: Denies feeling depressed, anxious ENDOCRINOLOGIC: No reports of sweating, cold or heat intolerance. History: 
Wound Care Allergies Allergen Reactions  Penicillins Rash Lip swelling Family History Problem Relation Age of Onset 24 [facility] Arthritis-osteo Mother  Lung Disease Father  Cancer Brother  Stroke Brother Past Medical History:  
Diagnosis Date  Arthritis  HTN (hypertension) Past Surgical History:  
Procedure Laterality Date  HX TONSILLECTOMY Social History Substance Use Topics  Smoking status: Former Smoker Quit date: 7/8/1991  Smokeless tobacco: Never Used  Alcohol use No  
   
History Alcohol Use No  
 
History Drug Use No  
  
History Smoking Status  Former Smoker  Quit date: 7/8/1991 Smokeless Tobacco  
 Never Used Current Outpatient Prescriptions Medication Sig  
 gabapentin (NEURONTIN) 100 mg capsule Take 100 mg by mouth three (3) times daily.  acetaminophen (TYLENOL) 325 mg tablet Take 325 mg by mouth every six (6) hours as needed for Pain. No current facility-administered medications for this encounter. Objective: 
Visit Vitals  Pulse 66  Temp 98.5 °F (36.9 °C)  Resp 16  Breastfeeding No  
 
 
Vascular: 
Right DP 1/4; PT 1/4 Left DP 1/4; PT 1/4 Capillary fill time <2 seconds Edema: bilateral pitting Skin temperature is slightly cool Varicosities are present Bilateral legs exhibit hemosiderin deposits Dermatological: 
Nails are thickened, elongated, discolored, painful to palpation, 3mm thick, with subungual debris. Skin is dry and scaly Skin is normal temp and turgor No evidence of tinea pedis There is no maceration of the interspaces of the feet b/l. Wound #1 Location: left anterior leg Etiology: venous Size: see rn notes Margins: irregular Drainage: serous Odor: none Wound base: sc fat Lymphangitic streaking? No. 
Undermining? No. 
Sinus tracts? No. 
Probes to bone? No  
Subcutaneous crepitus? No 
Fluctuance? No 
 
Neurological: Protective sensation per 5.07 [place] Fawn monofilament is Rt 10/10 and Lt 10/10 Vibratory sensation at the Rt 1st MPJ present/ LT 1st MPJ present Epicritic sensation is intact. Patient is AAOx3, mood is normal.  
 
Orthopedic: B/L LE are symmetric ROM of ankle, STJ, 1st MTPJ is limited, MMT 5 out of 5 for B/L LE. No pedal amputations Constitutional: Pt is a well developed, 85F Lymphatics: negative tenderness to palpation of neck/axillary/inguinal nodes. Imaging / Labs / Cx / Px: 
4/20/16 FINDINGS:  ROSALIE is normal on the right and the left. PVR waveforms are 
 normal at the right and left ankle.  
  
IMPRESSION:  No evidence of hemodynamically significant lower 
extremity arterial obstruction. [facility] Roel Kelley DPM - [facility]. Sonam Rizzo, [address] [facility], [facility] [address], [address] [place] [address] Nw P:   F: [phone]

## 2018-09-27 ENCOUNTER — HOSPITAL ENCOUNTER (OUTPATIENT)
Dept: WOUND CARE | Age: 83
Discharge: HOME | End: 2018-09-27
Payer: MEDICARE

## 2018-09-27 VITALS
HEART RATE: 58 BPM | TEMPERATURE: 97.9 F | DIASTOLIC BLOOD PRESSURE: 58 MMHG | RESPIRATION RATE: 16 BRPM | SYSTOLIC BLOOD PRESSURE: 105 MMHG

## 2018-09-27 PROCEDURE — 29581 APPL MULTLAYER CMPRN SYS LEG: CPT

## 2018-09-27 NOTE — WOUND CARE
09/27/18 1325 Wound Leg Lower Right Date First Assessed/Time First Assessed: 09/20/18 1335   POA: Yes  Wound Type: Venous  Location: Leg Lower  Orientation: Right DRESSING STATUS Clean, dry, and intact DRESSING TYPE 4 x 4;Foam;Gauze wrap (melissa) Non-Pressure Injury Partial thickness (epider/derm) Wound Length (cm) 0.1 cm Wound Width (cm) 0.1 cm Wound Depth (cm) 0.1 Wound Surface area (cm^2) 0.01 cm^2 Change in Wound Size % 0 Epithelialization (%) 100 Drainage Amount  None Wound Odor None Periwound Skin Condition Intact Cleansing and Cleansing Agents  Normal saline Dressing Type Applied 4 x 4;Compression Wrap/Venous Stasis; Foam  
Procedure Tolerated Well

## 2018-09-27 NOTE — PROGRESS NOTES
Discharge Condition:Stable Ambulatory Status:cane Discharge Destination:home Transportation: private auto Accompanied by: eb

## 2018-10-01 ENCOUNTER — HOSPITAL ENCOUNTER (OUTPATIENT)
Dept: WOUND CARE | Age: 83
Discharge: HOME | End: 2018-10-01
Payer: MEDICARE

## 2018-10-01 VITALS
SYSTOLIC BLOOD PRESSURE: 133 MMHG | DIASTOLIC BLOOD PRESSURE: 68 MMHG | TEMPERATURE: 97.8 F | HEART RATE: 74 BPM | RESPIRATION RATE: 16 BRPM

## 2018-10-01 PROCEDURE — 99213 OFFICE O/P EST LOW 20 MIN: CPT

## 2018-10-01 NOTE — PROGRESS NOTES
[address] 
WOUND CARE PROGRESS NOTE Chai Dhillon 
MR#: [id number] : 1932 ACCOUNT #: [de-identified] DATE OF SERVICE: 10/01/2018 TREATING PHYSICIAN:  Albaro Morocho DPM 
 
SUBJECTIVE:  The patient presents today with her grandson for followup to a right venous leg ulceration. Denies any nausea, vomiting, fevers, night sweats, chills. PHYSICAL EXAMINATION: 
VITAL SIGNS:  Stable. The patient is afebrile. RIGHT LOWER EXTREMITY EXAMINATION:  The previously noted right lower leg wound is healed. Neurovascular status is intact and unchanged. ASSESSMENT: 
1. Right venous leg ulceration. 2.  Right lower extremity edema. PLAN:  I had a long discussion with the patient and her grandson this afternoon. Clinically, her wound is healed. She no longer needs wound care. The patient and her grandson state that she continues to open up wounds, one after the other in the same location once it was fully healed. They are looking for a more permanent solution. I informed the patient and her grandson that her wound is due to her underlying venous insufficiency. In order for her to not develop a new wound, she needs to use her CircAid wraps, which she has, on a consistent basis every day during the day and to take them off at night. Patient states that she has a hard time putting them on and the grandson states that he typically helps her out to put them on, but he cannot do it every day. I recommended maybe trying an over-the-counter pair of compression socks that have a zipper down the side, which should make putting them on a lot easier. If they are looking for another more permanent solution, I recommended following up with either Dr. Nguyen Knapp or one of his partners at the local vascular surgery office to see if the patient is a candidate for vein surgery. Patient is as needed.  
 
 
PATT Murillo 
D: 10/01/2018 13:59    
 T: 10/01/2018 14:12 
JOB #: [id number]

## 2018-10-01 NOTE — WOUND CARE
10/01/18 1321 Wound Leg Lower Right Date First Assessed/Time First Assessed: 09/20/18 1335   POA: Yes  Wound Type: Venous  Location: Leg Lower  Orientation: Right DRESSING STATUS Clean, dry, and intact DRESSING TYPE Compression Wrap/Venous Stasis; Foam  
Non-Pressure Injury Partial thickness (epider/derm) Wound Length (cm) 0 cm Wound Width (cm) 0 cm Wound Depth (cm) 0 Wound Surface area (cm^2) 0 cm^2 Change in Wound Size % 100 Drainage Amount  None Wound Odor None Periwound Skin Condition Intact Cleansing and Cleansing Agents  Soap and water

## 2019-02-11 ENCOUNTER — HOSPITAL ENCOUNTER (OUTPATIENT)
Dept: WOUND CARE | Age: 84
Discharge: HOME | End: 2019-02-11
Payer: MEDICARE

## 2019-02-11 VITALS
TEMPERATURE: 98.3 F | WEIGHT: 116 LBS | DIASTOLIC BLOOD PRESSURE: 63 MMHG | HEIGHT: 60 IN | SYSTOLIC BLOOD PRESSURE: 127 MMHG | HEART RATE: 84 BPM | RESPIRATION RATE: 16 BRPM | OXYGEN SATURATION: 94 % | BODY MASS INDEX: 22.78 KG/M2

## 2019-02-11 PROCEDURE — 99215 OFFICE O/P EST HI 40 MIN: CPT

## 2019-02-11 NOTE — WOUND CARE
Visit Vitals /63 Pulse 84 Temp 98.3 °F (36.8 °C) Resp 16 Ht 5' (1.524 m) Wt 52.6 kg (116 lb) SpO2 94% Breastfeeding? No  
BMI 22.65 kg/m²  
 
 
 02/11/19 1436 Wound Leg Lower Left;Lateral  
Date First Assessed/Time First Assessed: 02/11/19 1436   POA: Yes  Wound Type: Vascular  Location: Leg Lower  Orientation: Left;Lateral  
Dressing Status  Clean, dry, and intact Dressing Type  Gauze;Gauze wrap (melissa) Wound Length (cm) 0.1 cm Wound Width (cm) 0.1 cm Wound Depth (cm) 0.1 Wound Surface area (cm^2) 0.01 cm^2 Condition of Base Pink Drainage Amount  None Wound Odor None Periwound Skin Condition Intact

## 2019-02-11 NOTE — PROGRESS NOTES
Discharge Condition:stable Ambulatory Status:ambulatory Discharge Destination:home Transportation: private auto Accompanied by: self

## 2019-02-12 NOTE — PROGRESS NOTES
[address] 
WOUND CARE PROGRESS NOTE Name:  Vance Rivas 
MR#:  [id number] :  1932 ACCOUNT #:  [de-identified] ADMIT DATE:  2019 DISCHARGE DATE: 
 
TREATING PHYSICIAN:  Nhan Graff DPM 
 
SUBJECTIVE:  The patient presents today stating that her primary care physician Dr. Jazmine Newman had instructed her to come back to the [address] [place] [address] because of a wound 
on her left ankle. The patient has a history of venous insufficiency and she wears 
compression stockings on both legs. She is typically accompanied by her grandson who 
is not present at today's visit. She states he moved since her last appointment here 
to a different location. OBJECTIVE: 
VITAL SIGNS:  Stable. The patient afebrile. EXTREMITIES:  Left lower extremity exam:  There is no open wound noted on the left 
lower extremity. The skin is intact. The patient's edema is under control. There 
is hyperpigmentation consistent with venous insufficiency. DP/PT pulses are 
palpable. ASSESSMENT: 
1. Left venous insufficiency. 2.  Left lower extremity edema. PLAN: 
1.  I had a long discussion with the patient this afternoon. I informed her that she 
does not currently have a wound on her left lower extremity. I informed her that she 
may have had a wound when she saw Dr. Dali Villalta, but at today's appointment, I do not 
appreciate one. 
2.  The patient is to continue using her compression stocking as instructed before. She can elevate when at rest. 
3.  She is to follow up as needed. Tran San DPM 
 
 
[id number] 
D:  2019 15:06 
T:  2019 23:41 JOB #:  [id number] CC:   Komal Corley MD

## 2019-04-22 ENCOUNTER — HOSPITAL ENCOUNTER (EMERGENCY)
Age: 84
Discharge: HOME | End: 2019-04-22
Attending: EMERGENCY MEDICINE
Payer: MEDICARE

## 2019-04-22 ENCOUNTER — APPOINTMENT (OUTPATIENT)
Dept: GENERAL RADIOLOGY | Age: 84
End: 2019-04-22
Attending: NURSE PRACTITIONER
Payer: MEDICARE

## 2019-04-22 VITALS
RESPIRATION RATE: 16 BRPM | DIASTOLIC BLOOD PRESSURE: 73 MMHG | HEART RATE: 68 BPM | SYSTOLIC BLOOD PRESSURE: 130 MMHG | TEMPERATURE: 98.6 F | OXYGEN SATURATION: 98 %
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
## 2019-04-22 DIAGNOSIS — M54.41 ACUTE BILATERAL LOW BACK PAIN WITH BILATERAL SCIATICA: ICD-10-CM

## 2019-04-22 DIAGNOSIS — M54.42 ACUTE BILATERAL LOW BACK PAIN WITH BILATERAL SCIATICA: ICD-10-CM

## 2019-04-22 PROCEDURE — 72170 X-RAY EXAM OF PELVIS: CPT

## 2019-04-22 PROCEDURE — 72100 X-RAY EXAM L-S SPINE 2/3 VWS: CPT

## 2019-04-22 PROCEDURE — 74011250637 HC RX REV CODE- 250/637: Performed by: NURSE PRACTITIONER

## 2019-04-22 PROCEDURE — 99283 EMERGENCY DEPT VISIT LOW MDM: CPT

## 2019-04-22 RX ORDER — TRAMADOL HYDROCHLORIDE 50 MG/1
50 TABLET ORAL
Status: COMPLETED | OUTPATIENT
Start: 2019-04-22 | End: 2019-04-22

## 2019-04-22 RX ORDER — PREDNISONE 20 MG/1
60 TABLET ORAL
Qty: 15 TAB | Refills: 0 | Status: SHIPPED | OUTPATIENT
Start: 2019-04-22 | End: 2019-04-27

## 2019-04-22 RX ORDER — TRAMADOL HYDROCHLORIDE 50 MG/1
50 TABLET ORAL
Qty: 15 TAB | Refills: 0 | Status: SHIPPED | OUTPATIENT
Start: 2019-04-22 | End: 2019-04-27

## 2019-04-22 RX ADMIN — TRAMADOL HYDROCHLORIDE 50 MG: 50 TABLET, FILM COATED ORAL at 14:13

## 2019-04-22 NOTE — DISCHARGE INSTRUCTIONS
Thank you for allowing us to care for you today. Please follow-up with your Primary Care provider in the next 2-3 days if your symptoms do not improve. Plan for home:     Prednisone 60 mg tablets, take with breakfast each morning for the next 5 days. Ultram every 8 hours as needed for pain. Gentle stretching of the lower back    You should use ice or heat for your injury and pain. You may use one or the other or alternate between the two. ICE ONLY FOR FIRST 50 HOURS after your injury! If it has been longer than 48 hours you may start with either. If you use ice: apply the ice pack in 20 minute intervals. 20 minutes on then 20 minutes off. Make sure to protect the skin to prevent frost bite. Never apply ice or a plastic bag with ice directly to the skin. If you use heat: Do NOT lay or sleep on a heating pad. You will burn your skin. Instead, use microwave style heat packs or Thermacare packs. These are safer than traditional heating pads. Monitor your skin to prevent burns. Should you develop inability to stand, controlling your bowel or bladder, numbness in the groin were your wipe yourself after using the bathroom, develop fevers or chills you should return to the emergency department immediately. Follow-up with Primary care for more sessions of physical therapy.

## 2019-04-22 NOTE — ED NOTES
Discharge instructions given to patient by NP and nurse. Pt has been given counseling on medication use and verbalizes understanding. Pt wheelchaired off of unit in no signs of distress.

## 2019-04-22 NOTE — ED TRIAGE NOTES
TRIAGE:Pt arrives with c/o 'falling a few months ago, and again on Saturday, was found on her bottom on the floor and since then has not been able to ambulate and do ADLs like normal' . Has had L hip and lower back pain. PCP said to come in.  Denies fever, chills, n/v

## 2019-04-22 NOTE — ED PROVIDER NOTES
Initial Complaint: Back pain Fall 6 weeks ago. Seen at [facility]. Had follow-up with PCP on Friday. Had home PT recently. Started:19, fell back onto the floor in the kitchen. Endorses: Difficulty moving left leg. Difficulty walking. Took and hour to get from the back to the front of the house to come to the ER. Has to physically   the left leg to move it. Pain with weightbearing. Decreased PO intact with pain. No BM in last few days. Tingling in the left leg like \"its asleep\". Denies: F/C, N/V Made better: rest 
Made worse:movement No further complaints. Past Medical History: 
No date: Arthritis No date: HTN (hypertension) Past Surgical History: 
No date: HX TONSILLECTOMY Reviewed Primary care provider: Raquel Maldonado MD 
 
 
 
The history is provided by the patient and a relative. No  was used. Past Medical History:  
Diagnosis Date  Arthritis  HTN (hypertension) Past Surgical History:  
Procedure Laterality Date  HX TONSILLECTOMY Family History:  
Problem Relation Age of Onset 24 [facility] Arthritis-osteo Mother  Lung Disease Father  Cancer Brother  Stroke Brother Social History Socioeconomic History  Marital status: SINGLE Spouse name: Not on file  Number of children: Not on file  Years of education: Not on file  Highest education level: Not on file Occupational History  Not on file Social Needs  Financial resource strain: Not on file  Food insecurity:  
  Worry: Not on file Inability: Not on file  Transportation needs:  
  Medical: Not on file Non-medical: Not on file Tobacco Use  Smoking status: Former Smoker Last attempt to quit: 1991 Years since quittin.8  Smokeless tobacco: Never Used Substance and Sexual Activity  Alcohol use: No  
  Alcohol/week: 0.0 oz  Drug use: No  
 Sexual activity: Never Lifestyle  Physical activity: Days per week: Not on file Minutes per session: Not on file  Stress: Not on file Relationships  Social connections:  
  Talks on phone: Not on file Gets together: Not on file Attends [employer] service: Not on file Active member of club or organization: Not on file Attends meetings of clubs or organizations: Not on file Relationship status: Not on file  Intimate partner violence:  
  Fear of current or ex partner: Not on file Emotionally abused: Not on file Physically abused: Not on file Forced sexual activity: Not on file Other Topics Concern  Not on file Social History Narrative  Not on file ALLERGIES: Penicillins Review of Systems Constitutional: Positive for activity change and appetite change. Negative for chills, fatigue and fever. Respiratory: Negative for shortness of breath. Cardiovascular: Negative for chest pain. Gastrointestinal: Positive for constipation. Negative for abdominal pain, nausea and vomiting. Genitourinary: Negative for urgency. Musculoskeletal: Positive for arthralgias, back pain, gait problem and myalgias. Neurological: Positive for headaches. Hematological: Negative. Psychiatric/Behavioral: Negative. Vitals:  
 04/22/19 1313 04/22/19 1335 BP:  130/73 Pulse: 81 68 Resp:  16 Temp:  98.6 °F (37 °C) SpO2: 95% 98% Physical Exam  
Constitutional: She is oriented to person, place, and time. She appears well-developed and well-nourished. HENT:  
Head: Normocephalic and atraumatic. Neck: Normal range of motion. Cardiovascular: Normal rate, regular rhythm, normal heart sounds and intact distal pulses. Pulmonary/Chest: Effort normal and breath sounds normal. No respiratory distress. She has no wheezes. She has no rales. She exhibits no tenderness. Abdominal: Soft. Bowel sounds are normal. There is no tenderness. There is no guarding. Musculoskeletal: Lumbar back: She exhibits decreased range of motion, tenderness, bony tenderness and pain. She exhibits no swelling, no edema, no deformity, no laceration, no spasm and normal pulse. Back: 
 
Lumbar pain starting at the waist and radiating to the buttock and knee. Pain with hip flexion. No C or T spine TTP. Neurological: She is alert and oriented to person, place, and time. Skin: Skin is warm and dry. No erythema. Psychiatric: She has a normal mood and affect. Her behavior is normal. Judgment and thought content normal.  
Nursing note and vitals reviewed. MDM Procedures Assessment & Plan:  
 
Orders Placed This Encounter  XR PELV 3 V  
 XR SPINE LUMB 2 OR 3 V  
 traMADol (ULTRAM) tablet 50 mg Discussed with Libbie Litten, MD,ED Provider Kashif Dallas NP 
04/22/19 
1:45 PM 
 
Imaging without acute findings. Follow-up with PCP. May need additional PT sessions. Discussed return precautions. Ultram and prednisone for sciatica. 2:43 PM 
The patient has been reevaluated. The patient is ready for discharge. The patient's signs, symptoms, diagnosis, and discharge instructions have been discussed and the patient/ family has conveyed their understanding. The patient is to follow up as recommended or return to the ED should their symptoms worsen. Plan has been discussed and the patient is in agreement. LABORATORY TESTS: 
Labs Reviewed - No data to display IMAGING RESULTS: 
Xr Spine Lumb 2 Or 3 V Result Date: 4/22/2019 EXAM:  XR SPINE LUMB 2 OR 3 V INDICATION: Fall with bilateral hip pain and sciatica COMPARISON: None. TECHNIQUE: 3 views lumbar spine FINDINGS: There is no acute fracture or subluxation. Vertebral body heights are maintained. There is diffuse intervertebral disc space narrowing and facet arthrosis. There is dextroconvex lumbar curvature. There is 4 mm of anterolisthesis at L4-5 and 13 mm of anterolisthesis of L5-S1. IMPRESSION: No acute abnormality. Diffuse lumbar degenerative changes with grade 1 anterolisthesis at L4-5 and grade 2 anterolisthesis at L5-S1. Xr Pelv 1 Or 2 V Result Date: 4/22/2019 EXAM:  XR PELV 1 OR 2 V INDICATION: Bilateral hip pain and sciatica after fall COMPARISON: 6/26/2017. TECHNIQUE: Frontal pelvis view FINDINGS: There is no acute fracture or dislocation. There is stable mild bilateral hip joint space narrowing. Lumbosacral degenerative changes are noted. IMPRESSION: No acute abnormality. MEDICATIONS GIVEN: 
Medications  
traMADol (ULTRAM) tablet 50 mg (50 mg Oral Given 4/22/19 1413) IMPRESSION: 
1. Fall, initial encounter 2. Acute bilateral low back pain with bilateral sciatica PLAN: 
1. Current Discharge Medication List  
  
START taking these medications Details  
traMADol (ULTRAM) 50 mg tablet Take 1 Tab by mouth every eight (8) hours as needed for Pain for up to 5 days. Max Daily Amount: 150 mg. 
Qty: 15 Tab, Refills: 0 Associated Diagnoses: Acute bilateral low back pain with bilateral sciatica  
  
predniSONE (DELTASONE) 20 mg tablet Take 60 mg by mouth daily (with breakfast) for 5 days. With Breakfast 
Qty: 15 Tab, Refills: 0  
  
  
 
2. Follow-up Information Follow up With Specialties Details Why Contact Info Amina Riley NP Nurse Practitioner Schedule an appointment as soon as possible for a visit  [address] 
[phone] [facility] EMERGENCY DEP Emergency Medicine  As needed, If symptoms worsen [address] [place] [id number] 
[phone] 3. Return to ED for new or worsening symptoms Clarence Reyes NP